# Patient Record
Sex: FEMALE | Race: BLACK OR AFRICAN AMERICAN | NOT HISPANIC OR LATINO | URBAN - METROPOLITAN AREA
[De-identification: names, ages, dates, MRNs, and addresses within clinical notes are randomized per-mention and may not be internally consistent; named-entity substitution may affect disease eponyms.]

---

## 2017-05-16 ENCOUNTER — OUTPATIENT (OUTPATIENT)
Dept: OUTPATIENT SERVICES | Facility: HOSPITAL | Age: 32
LOS: 1 days | End: 2017-05-16

## 2017-05-18 DIAGNOSIS — Z01.20 ENCOUNTER FOR DENTAL EXAMINATION AND CLEANING WITHOUT ABNORMAL FINDINGS: ICD-10-CM

## 2017-06-15 ENCOUNTER — APPOINTMENT (OUTPATIENT)
Dept: OBGYN | Facility: HOSPITAL | Age: 32
End: 2017-06-15

## 2017-06-15 ENCOUNTER — LABORATORY RESULT (OUTPATIENT)
Age: 32
End: 2017-06-15

## 2017-06-15 ENCOUNTER — OUTPATIENT (OUTPATIENT)
Dept: OUTPATIENT SERVICES | Facility: HOSPITAL | Age: 32
LOS: 1 days | End: 2017-06-15

## 2017-06-15 VITALS
WEIGHT: 105 LBS | HEART RATE: 70 BPM | SYSTOLIC BLOOD PRESSURE: 133 MMHG | DIASTOLIC BLOOD PRESSURE: 85 MMHG | BODY MASS INDEX: 22.72 KG/M2

## 2017-06-15 DIAGNOSIS — N92.6 IRREGULAR MENSTRUATION, UNSPECIFIED: ICD-10-CM

## 2017-06-15 DIAGNOSIS — Z30.09 ENCOUNTER FOR OTHER GENERAL COUNSELING AND ADVICE ON CONTRACEPTION: ICD-10-CM

## 2017-06-15 DIAGNOSIS — Z01.419 ENCOUNTER FOR GYNECOLOGICAL EXAMINATION (GENERAL) (ROUTINE) WITHOUT ABNORMAL FINDINGS: ICD-10-CM

## 2017-06-15 LAB
ALBUMIN SERPL ELPH-MCNC: 4.1 G/DL — SIGNIFICANT CHANGE UP (ref 3.3–5)
ALP SERPL-CCNC: 82 U/L — SIGNIFICANT CHANGE UP (ref 40–120)
ALT FLD-CCNC: 12 U/L — SIGNIFICANT CHANGE UP (ref 4–33)
AST SERPL-CCNC: 19 U/L — SIGNIFICANT CHANGE UP (ref 4–32)
BASOPHILS # BLD AUTO: 0.02 K/UL — SIGNIFICANT CHANGE UP (ref 0–0.2)
BASOPHILS NFR BLD AUTO: 0.6 % — SIGNIFICANT CHANGE UP (ref 0–2)
BILIRUB SERPL-MCNC: 0.5 MG/DL — SIGNIFICANT CHANGE UP (ref 0.2–1.2)
BUN SERPL-MCNC: 12 MG/DL — SIGNIFICANT CHANGE UP (ref 7–23)
CALCIUM SERPL-MCNC: 9.1 MG/DL — SIGNIFICANT CHANGE UP (ref 8.4–10.5)
CHLORIDE SERPL-SCNC: 103 MMOL/L — SIGNIFICANT CHANGE UP (ref 98–107)
CO2 SERPL-SCNC: 22 MMOL/L — SIGNIFICANT CHANGE UP (ref 22–31)
CREAT SERPL-MCNC: 0.77 MG/DL — SIGNIFICANT CHANGE UP (ref 0.5–1.3)
EOSINOPHIL # BLD AUTO: 0.11 K/UL — SIGNIFICANT CHANGE UP (ref 0–0.5)
EOSINOPHIL NFR BLD AUTO: 3.2 % — SIGNIFICANT CHANGE UP (ref 0–6)
GLUCOSE SERPL-MCNC: 83 MG/DL — SIGNIFICANT CHANGE UP (ref 70–99)
HBA1C BLD-MCNC: 5.5 % — SIGNIFICANT CHANGE UP (ref 4–5.6)
HBV SURFACE AG SER-ACNC: NEGATIVE — SIGNIFICANT CHANGE UP
HCT VFR BLD CALC: 39.7 % — SIGNIFICANT CHANGE UP (ref 34.5–45)
HGB BLD-MCNC: 12.5 G/DL — SIGNIFICANT CHANGE UP (ref 11.5–15.5)
HIV1 AG SER QL: SIGNIFICANT CHANGE UP
HIV1+2 AB SPEC QL: SIGNIFICANT CHANGE UP
IMM GRANULOCYTES NFR BLD AUTO: 0.3 % — SIGNIFICANT CHANGE UP (ref 0–1.5)
LYMPHOCYTES # BLD AUTO: 1.18 K/UL — SIGNIFICANT CHANGE UP (ref 1–3.3)
LYMPHOCYTES # BLD AUTO: 34.5 % — SIGNIFICANT CHANGE UP (ref 13–44)
MCHC RBC-ENTMCNC: 28 PG — SIGNIFICANT CHANGE UP (ref 27–34)
MCHC RBC-ENTMCNC: 31.5 % — LOW (ref 32–36)
MCV RBC AUTO: 89 FL — SIGNIFICANT CHANGE UP (ref 80–100)
MONOCYTES # BLD AUTO: 0.31 K/UL — SIGNIFICANT CHANGE UP (ref 0–0.9)
MONOCYTES NFR BLD AUTO: 9.1 % — SIGNIFICANT CHANGE UP (ref 2–14)
NEUTROPHILS # BLD AUTO: 1.79 K/UL — LOW (ref 1.8–7.4)
NEUTROPHILS NFR BLD AUTO: 52.3 % — SIGNIFICANT CHANGE UP (ref 43–77)
PLATELET # BLD AUTO: 265 K/UL — SIGNIFICANT CHANGE UP (ref 150–400)
PMV BLD: 11.2 FL — SIGNIFICANT CHANGE UP (ref 7–13)
POTASSIUM SERPL-MCNC: 3.8 MMOL/L — SIGNIFICANT CHANGE UP (ref 3.5–5.3)
POTASSIUM SERPL-SCNC: 3.8 MMOL/L — SIGNIFICANT CHANGE UP (ref 3.5–5.3)
PROT SERPL-MCNC: 7.2 G/DL — SIGNIFICANT CHANGE UP (ref 6–8.3)
RBC # BLD: 4.46 M/UL — SIGNIFICANT CHANGE UP (ref 3.8–5.2)
RBC # FLD: 13.3 % — SIGNIFICANT CHANGE UP (ref 10.3–14.5)
SODIUM SERPL-SCNC: 140 MMOL/L — SIGNIFICANT CHANGE UP (ref 135–145)
WBC # BLD: 3.42 K/UL — LOW (ref 3.8–10.5)
WBC # FLD AUTO: 3.42 K/UL — LOW (ref 3.8–10.5)

## 2017-06-16 LAB
C TRACH RRNA SPEC QL NAA+PROBE: SIGNIFICANT CHANGE UP
HCV AB S/CO SERPL IA: 0.1 S/CO — SIGNIFICANT CHANGE UP
HCV AB SERPL-IMP: SIGNIFICANT CHANGE UP
N GONORRHOEA RRNA SPEC QL NAA+PROBE: SIGNIFICANT CHANGE UP
SPECIMEN SOURCE: SIGNIFICANT CHANGE UP
T PALLIDUM AB TITR SER: NEGATIVE — SIGNIFICANT CHANGE UP

## 2017-06-20 LAB
HCG UR QL: NEGATIVE
QUALITY CONTROL: YES

## 2017-06-29 ENCOUNTER — APPOINTMENT (OUTPATIENT)
Dept: OBGYN | Facility: HOSPITAL | Age: 32
End: 2017-06-29

## 2017-07-27 ENCOUNTER — APPOINTMENT (OUTPATIENT)
Dept: OBGYN | Facility: HOSPITAL | Age: 32
End: 2017-07-27

## 2017-08-09 ENCOUNTER — APPOINTMENT (OUTPATIENT)
Dept: INTERNAL MEDICINE | Facility: HOSPITAL | Age: 32
End: 2017-08-09
Payer: MEDICAID

## 2017-08-09 ENCOUNTER — OUTPATIENT (OUTPATIENT)
Dept: OUTPATIENT SERVICES | Facility: HOSPITAL | Age: 32
LOS: 1 days | End: 2017-08-09

## 2017-08-09 VITALS — BODY MASS INDEX: 22.44 KG/M2 | HEIGHT: 57 IN | WEIGHT: 104 LBS

## 2017-08-09 VITALS — DIASTOLIC BLOOD PRESSURE: 80 MMHG | SYSTOLIC BLOOD PRESSURE: 132 MMHG

## 2017-08-09 PROCEDURE — 99213 OFFICE O/P EST LOW 20 MIN: CPT | Mod: GC

## 2017-08-14 DIAGNOSIS — G80.9 CEREBRAL PALSY, UNSPECIFIED: ICD-10-CM

## 2018-10-01 ENCOUNTER — OUTPATIENT (OUTPATIENT)
Dept: OUTPATIENT SERVICES | Facility: HOSPITAL | Age: 33
LOS: 1 days | End: 2018-10-01
Payer: MEDICAID

## 2018-10-01 PROCEDURE — G9001: CPT

## 2018-10-06 ENCOUNTER — EMERGENCY (EMERGENCY)
Facility: HOSPITAL | Age: 33
LOS: 0 days | Discharge: HOME | End: 2018-10-07
Attending: EMERGENCY MEDICINE
Payer: MEDICAID

## 2018-10-06 VITALS
WEIGHT: 123.46 LBS | HEIGHT: 59 IN | SYSTOLIC BLOOD PRESSURE: 136 MMHG | OXYGEN SATURATION: 97 % | HEART RATE: 93 BPM | DIASTOLIC BLOOD PRESSURE: 99 MMHG | RESPIRATION RATE: 17 BRPM | TEMPERATURE: 98 F

## 2018-10-06 DIAGNOSIS — R06.02 SHORTNESS OF BREATH: ICD-10-CM

## 2018-10-06 DIAGNOSIS — R07.9 CHEST PAIN, UNSPECIFIED: ICD-10-CM

## 2018-10-06 DIAGNOSIS — M79.604 PAIN IN RIGHT LEG: ICD-10-CM

## 2018-10-06 DIAGNOSIS — R06.00 DYSPNEA, UNSPECIFIED: ICD-10-CM

## 2018-10-06 DIAGNOSIS — M79.605 PAIN IN LEFT LEG: ICD-10-CM

## 2018-10-06 PROCEDURE — 99285 EMERGENCY DEPT VISIT HI MDM: CPT

## 2018-10-06 NOTE — ED ADULT TRIAGE NOTE - CHIEF COMPLAINT QUOTE
as per significant other "I was here twice for a lung infection and there was something in the house and we use a cover and the cover must had mold and she told me she could not breathe and she was having chest pain, she about 1 month pregnant"

## 2018-10-07 VITALS
DIASTOLIC BLOOD PRESSURE: 73 MMHG | RESPIRATION RATE: 15 BRPM | OXYGEN SATURATION: 98 % | TEMPERATURE: 98 F | HEART RATE: 89 BPM | SYSTOLIC BLOOD PRESSURE: 118 MMHG

## 2018-10-07 LAB
ALBUMIN SERPL ELPH-MCNC: 3.4 G/DL — SIGNIFICANT CHANGE UP (ref 3.3–5)
ALP SERPL-CCNC: 117 U/L — SIGNIFICANT CHANGE UP (ref 40–120)
ALT FLD-CCNC: 29 U/L — SIGNIFICANT CHANGE UP (ref 12–78)
ANION GAP SERPL CALC-SCNC: 9 MMOL/L — SIGNIFICANT CHANGE UP (ref 5–17)
APTT BLD: 27.7 SEC — SIGNIFICANT CHANGE UP (ref 27.5–37.4)
AST SERPL-CCNC: 20 U/L — SIGNIFICANT CHANGE UP (ref 15–37)
BASOPHILS # BLD AUTO: 0.05 K/UL — SIGNIFICANT CHANGE UP (ref 0–0.2)
BASOPHILS NFR BLD AUTO: 0.7 % — SIGNIFICANT CHANGE UP (ref 0–2)
BILIRUB SERPL-MCNC: 0.2 MG/DL — SIGNIFICANT CHANGE UP (ref 0.2–1.2)
BUN SERPL-MCNC: 8 MG/DL — SIGNIFICANT CHANGE UP (ref 7–23)
CALCIUM SERPL-MCNC: 9.1 MG/DL — SIGNIFICANT CHANGE UP (ref 8.5–10.1)
CHLORIDE SERPL-SCNC: 106 MMOL/L — SIGNIFICANT CHANGE UP (ref 96–108)
CK MB BLD-MCNC: <0.7 % — SIGNIFICANT CHANGE UP (ref 0–3.5)
CK MB CFR SERPL CALC: <1 NG/ML — SIGNIFICANT CHANGE UP (ref 0.5–3.6)
CK SERPL-CCNC: 136 U/L — SIGNIFICANT CHANGE UP (ref 26–192)
CO2 SERPL-SCNC: 22 MMOL/L — SIGNIFICANT CHANGE UP (ref 22–31)
CREAT SERPL-MCNC: 0.77 MG/DL — SIGNIFICANT CHANGE UP (ref 0.5–1.3)
D DIMER BLD IA.RAPID-MCNC: 280 NG/ML DDU — HIGH
EOSINOPHIL # BLD AUTO: 0.16 K/UL — SIGNIFICANT CHANGE UP (ref 0–0.5)
EOSINOPHIL NFR BLD AUTO: 2.2 % — SIGNIFICANT CHANGE UP (ref 0–6)
GLUCOSE SERPL-MCNC: 154 MG/DL — HIGH (ref 70–99)
HCG SERPL-ACNC: HIGH MIU/ML
HCT VFR BLD CALC: 40.6 % — SIGNIFICANT CHANGE UP (ref 34.5–45)
HGB BLD-MCNC: 13.3 G/DL — SIGNIFICANT CHANGE UP (ref 11.5–15.5)
IMM GRANULOCYTES NFR BLD AUTO: 0.1 % — SIGNIFICANT CHANGE UP (ref 0–1.5)
INR BLD: 1.01 RATIO — SIGNIFICANT CHANGE UP (ref 0.88–1.16)
LYMPHOCYTES # BLD AUTO: 1.4 K/UL — SIGNIFICANT CHANGE UP (ref 1–3.3)
LYMPHOCYTES # BLD AUTO: 18.8 % — SIGNIFICANT CHANGE UP (ref 13–44)
MCHC RBC-ENTMCNC: 29.2 PG — SIGNIFICANT CHANGE UP (ref 27–34)
MCHC RBC-ENTMCNC: 32.8 GM/DL — SIGNIFICANT CHANGE UP (ref 32–36)
MCV RBC AUTO: 89.2 FL — SIGNIFICANT CHANGE UP (ref 80–100)
MONOCYTES # BLD AUTO: 0.79 K/UL — SIGNIFICANT CHANGE UP (ref 0–0.9)
MONOCYTES NFR BLD AUTO: 10.6 % — SIGNIFICANT CHANGE UP (ref 2–14)
NEUTROPHILS # BLD AUTO: 5.02 K/UL — SIGNIFICANT CHANGE UP (ref 1.8–7.4)
NEUTROPHILS NFR BLD AUTO: 67.6 % — SIGNIFICANT CHANGE UP (ref 43–77)
NRBC # BLD: 0 /100 WBCS — SIGNIFICANT CHANGE UP (ref 0–0)
NT-PROBNP SERPL-SCNC: 41 PG/ML — SIGNIFICANT CHANGE UP (ref 0–125)
PLATELET # BLD AUTO: 259 K/UL — SIGNIFICANT CHANGE UP (ref 150–400)
POTASSIUM SERPL-MCNC: 4.1 MMOL/L — SIGNIFICANT CHANGE UP (ref 3.5–5.3)
POTASSIUM SERPL-SCNC: 4.1 MMOL/L — SIGNIFICANT CHANGE UP (ref 3.5–5.3)
PROT SERPL-MCNC: 7.4 GM/DL — SIGNIFICANT CHANGE UP (ref 6–8.3)
PROTHROM AB SERPL-ACNC: 11 SEC — SIGNIFICANT CHANGE UP (ref 9.8–12.7)
RBC # BLD: 4.55 M/UL — SIGNIFICANT CHANGE UP (ref 3.8–5.2)
RBC # FLD: 12.9 % — SIGNIFICANT CHANGE UP (ref 10.3–14.5)
SODIUM SERPL-SCNC: 137 MMOL/L — SIGNIFICANT CHANGE UP (ref 135–145)
TROPONIN I SERPL-MCNC: <.015 NG/ML — SIGNIFICANT CHANGE UP (ref 0.01–0.04)
WBC # BLD: 7.43 K/UL — SIGNIFICANT CHANGE UP (ref 3.8–10.5)
WBC # FLD AUTO: 7.43 K/UL — SIGNIFICANT CHANGE UP (ref 3.8–10.5)

## 2018-10-07 PROCEDURE — 93970 EXTREMITY STUDY: CPT | Mod: 26

## 2018-10-07 RX ORDER — ALBUTEROL 90 UG/1
2 AEROSOL, METERED ORAL
Qty: 1 | Refills: 0 | OUTPATIENT
Start: 2018-10-07 | End: 2018-11-05

## 2018-10-07 RX ORDER — IPRATROPIUM/ALBUTEROL SULFATE 18-103MCG
3 AEROSOL WITH ADAPTER (GRAM) INHALATION ONCE
Qty: 0 | Refills: 0 | Status: COMPLETED | OUTPATIENT
Start: 2018-10-07 | End: 2018-10-07

## 2018-10-07 RX ADMIN — Medication 3 MILLILITER(S): at 01:14

## 2018-10-07 RX ADMIN — Medication 3 MILLILITER(S): at 00:39

## 2018-10-07 NOTE — ED PROVIDER NOTE - PROGRESS NOTE DETAILS
Patient received albuterol with complete resolution of chest pain or SOB.  Patient's Dimer is elevated, discussed possibility of PE, although Dimer may be elevated as result of pregnancy.  Patient would prefer Patient received albuterol with complete resolution of chest pain or SOB.  Patient's Dimer is elevated, discussed possibility of PE, although Dimer may be elevated as result of pregnancy.  Patient would prefer to have DVT US study instead.  Despite her mild CP/MR, she displays very good comprehension.

## 2018-10-07 NOTE — ED PROVIDER NOTE - MEDICAL DECISION MAKING DETAILS
Patient presents with shortness of breath, occasional chest pain.  VSS, looks comfortable.  Patient is pregnant, denies any abdominal pain or bleeding.  Feels symptoms are due to mold in house, her spouse is more symptomatic than she is.  Patient's Dimer is elevated which can be seen in setting of pregnancy, DVT negative, patient does not want CT imaging, denies any SOB or chest pain after she received neb.  Denies any exertional shortness of breath.  Feels good, prefers to see if her symptoms return, will respect patient's wishes as suspicion for PE is low (suspect that she is more likely here because of her partner).  No signs of infection.  Patient to follow up with OB/GYN. Signs and symptoms of PE which should prompt immediate return were discussed with patient. Discussed results and outcome of today's visit with the patient.  Patient advised to please follow up with another healthcare provider within the next 24 hours and return to the Emergency Department for worsening symptoms or any other concerns.  Patient advised that their doctor may call  to follow up on the specific results of the tests performed today in the emergency department.   Patient appears well on discharge.

## 2018-10-07 NOTE — ED PROVIDER NOTE - OBJECTIVE STATEMENT
Pertinent PMH/PSH/FHx/SHx and Review of Systems contained within:  Patient presents to the ED for chest pain.  Patient is well appearing, laughing, smiling, says that she has very mild chest pain with difficulty breathing which started about 2-3 days ago.  She is here with her spouse who is also having difficulty breathing.  Both look comfortable and say that they have been exposed to mold in the home which is likely causing their symptoms.  Patient also believes that she is pregnant since she skipped her period, LMP was Aug 21.  Did not take any pregnancy tests at home.  Denies any abdominal pain or vaginal bleeding, denies any leg swelling.  No fevers or cough.     Relevant PMHx/SHx/SOCHx/FAMH:  Mild CP,   Patient denies EtOH/tobacco/illicit substance use.    ROS: No fever/chills, No headache/photophobia/eye pain/changes in vision, No ear pain/sore throat/dysphagia, No palpitations, no cough/wheeze/stridor, No abdominal pain, No N/V/D/melena, no dysuria/frequency/discharge, No neck/back pain, no rash, no changes in neurological status/function.

## 2018-10-19 DIAGNOSIS — Z71.89 OTHER SPECIFIED COUNSELING: ICD-10-CM

## 2018-11-04 ENCOUNTER — EMERGENCY (EMERGENCY)
Facility: HOSPITAL | Age: 33
LOS: 0 days | Discharge: HOME | End: 2018-11-04
Attending: EMERGENCY MEDICINE
Payer: MEDICAID

## 2018-11-04 VITALS
DIASTOLIC BLOOD PRESSURE: 92 MMHG | RESPIRATION RATE: 18 BRPM | WEIGHT: 126.55 LBS | HEART RATE: 95 BPM | SYSTOLIC BLOOD PRESSURE: 142 MMHG | OXYGEN SATURATION: 100 % | TEMPERATURE: 98 F

## 2018-11-04 VITALS
RESPIRATION RATE: 18 BRPM | OXYGEN SATURATION: 98 % | SYSTOLIC BLOOD PRESSURE: 129 MMHG | DIASTOLIC BLOOD PRESSURE: 83 MMHG | TEMPERATURE: 97 F | HEART RATE: 91 BPM

## 2018-11-04 DIAGNOSIS — R06.02 SHORTNESS OF BREATH: ICD-10-CM

## 2018-11-04 DIAGNOSIS — Z98.890 OTHER SPECIFIED POSTPROCEDURAL STATES: Chronic | ICD-10-CM

## 2018-11-04 DIAGNOSIS — R82.71 BACTERIURIA: ICD-10-CM

## 2018-11-04 DIAGNOSIS — R10.13 EPIGASTRIC PAIN: ICD-10-CM

## 2018-11-04 LAB
ALBUMIN SERPL ELPH-MCNC: 2.8 G/DL — LOW (ref 3.3–5)
ALP SERPL-CCNC: 106 U/L — SIGNIFICANT CHANGE UP (ref 40–120)
ALT FLD-CCNC: 26 U/L — SIGNIFICANT CHANGE UP (ref 12–78)
ANION GAP SERPL CALC-SCNC: 10 MMOL/L — SIGNIFICANT CHANGE UP (ref 5–17)
APPEARANCE UR: CLEAR — SIGNIFICANT CHANGE UP
APTT BLD: 28.9 SEC — SIGNIFICANT CHANGE UP (ref 28.5–37)
AST SERPL-CCNC: 9 U/L — LOW (ref 15–37)
BACTERIA # UR AUTO: ABNORMAL
BASOPHILS # BLD AUTO: 0.03 K/UL — SIGNIFICANT CHANGE UP (ref 0–0.2)
BASOPHILS NFR BLD AUTO: 0.4 % — SIGNIFICANT CHANGE UP (ref 0–2)
BILIRUB SERPL-MCNC: 0.2 MG/DL — SIGNIFICANT CHANGE UP (ref 0.2–1.2)
BILIRUB UR-MCNC: NEGATIVE — SIGNIFICANT CHANGE UP
BLD GP AB SCN SERPL QL: SIGNIFICANT CHANGE UP
BUN SERPL-MCNC: 9 MG/DL — SIGNIFICANT CHANGE UP (ref 7–23)
CALCIUM SERPL-MCNC: 8.2 MG/DL — LOW (ref 8.5–10.1)
CHLORIDE SERPL-SCNC: 110 MMOL/L — HIGH (ref 96–108)
CO2 SERPL-SCNC: 20 MMOL/L — LOW (ref 22–31)
COLOR SPEC: YELLOW — SIGNIFICANT CHANGE UP
CREAT SERPL-MCNC: 0.59 MG/DL — SIGNIFICANT CHANGE UP (ref 0.5–1.3)
DIFF PNL FLD: NEGATIVE — SIGNIFICANT CHANGE UP
EOSINOPHIL # BLD AUTO: 0.21 K/UL — SIGNIFICANT CHANGE UP (ref 0–0.5)
EOSINOPHIL NFR BLD AUTO: 2.6 % — SIGNIFICANT CHANGE UP (ref 0–6)
EPI CELLS # UR: ABNORMAL
GLUCOSE SERPL-MCNC: 108 MG/DL — HIGH (ref 70–99)
GLUCOSE UR QL: 1000 MG/DL
HCG SERPL-ACNC: HIGH MIU/ML
HCT VFR BLD CALC: 38.5 % — SIGNIFICANT CHANGE UP (ref 34.5–45)
HGB BLD-MCNC: 12.9 G/DL — SIGNIFICANT CHANGE UP (ref 11.5–15.5)
IMM GRANULOCYTES NFR BLD AUTO: 0.4 % — SIGNIFICANT CHANGE UP (ref 0–1.5)
INR BLD: 1.03 RATIO — SIGNIFICANT CHANGE UP (ref 0.88–1.16)
KETONES UR-MCNC: NEGATIVE — SIGNIFICANT CHANGE UP
LEUKOCYTE ESTERASE UR-ACNC: NEGATIVE — SIGNIFICANT CHANGE UP
LIDOCAIN IGE QN: 248 U/L — SIGNIFICANT CHANGE UP (ref 73–393)
LYMPHOCYTES # BLD AUTO: 1.77 K/UL — SIGNIFICANT CHANGE UP (ref 1–3.3)
LYMPHOCYTES # BLD AUTO: 21.9 % — SIGNIFICANT CHANGE UP (ref 13–44)
MCHC RBC-ENTMCNC: 29.7 PG — SIGNIFICANT CHANGE UP (ref 27–34)
MCHC RBC-ENTMCNC: 33.5 GM/DL — SIGNIFICANT CHANGE UP (ref 32–36)
MCV RBC AUTO: 88.7 FL — SIGNIFICANT CHANGE UP (ref 80–100)
MONOCYTES # BLD AUTO: 0.96 K/UL — HIGH (ref 0–0.9)
MONOCYTES NFR BLD AUTO: 11.9 % — SIGNIFICANT CHANGE UP (ref 2–14)
NEUTROPHILS # BLD AUTO: 5.1 K/UL — SIGNIFICANT CHANGE UP (ref 1.8–7.4)
NEUTROPHILS NFR BLD AUTO: 62.8 % — SIGNIFICANT CHANGE UP (ref 43–77)
NITRITE UR-MCNC: NEGATIVE — SIGNIFICANT CHANGE UP
NRBC # BLD: 0 /100 WBCS — SIGNIFICANT CHANGE UP (ref 0–0)
PH UR: 6 — SIGNIFICANT CHANGE UP (ref 5–8)
PLATELET # BLD AUTO: 234 K/UL — SIGNIFICANT CHANGE UP (ref 150–400)
POTASSIUM SERPL-MCNC: 4.1 MMOL/L — SIGNIFICANT CHANGE UP (ref 3.5–5.3)
POTASSIUM SERPL-SCNC: 4.1 MMOL/L — SIGNIFICANT CHANGE UP (ref 3.5–5.3)
PROT SERPL-MCNC: 6.9 GM/DL — SIGNIFICANT CHANGE UP (ref 6–8.3)
PROT UR-MCNC: 15 MG/DL
PROTHROM AB SERPL-ACNC: 11.5 SEC — SIGNIFICANT CHANGE UP (ref 10–12.9)
RBC # BLD: 4.34 M/UL — SIGNIFICANT CHANGE UP (ref 3.8–5.2)
RBC # FLD: 12.6 % — SIGNIFICANT CHANGE UP (ref 10.3–14.5)
RBC CASTS # UR COMP ASSIST: SIGNIFICANT CHANGE UP /HPF (ref 0–4)
SODIUM SERPL-SCNC: 140 MMOL/L — SIGNIFICANT CHANGE UP (ref 135–145)
SP GR SPEC: 1.02 — SIGNIFICANT CHANGE UP (ref 1.01–1.02)
TROPONIN I SERPL-MCNC: <.015 NG/ML — SIGNIFICANT CHANGE UP (ref 0.01–0.04)
UROBILINOGEN FLD QL: NEGATIVE MG/DL — SIGNIFICANT CHANGE UP
WBC # BLD: 8.1 K/UL — SIGNIFICANT CHANGE UP (ref 3.8–10.5)
WBC # FLD AUTO: 8.1 K/UL — SIGNIFICANT CHANGE UP (ref 3.8–10.5)
WBC UR QL: SIGNIFICANT CHANGE UP

## 2018-11-04 PROCEDURE — 76700 US EXAM ABDOM COMPLETE: CPT | Mod: 26

## 2018-11-04 PROCEDURE — 99284 EMERGENCY DEPT VISIT MOD MDM: CPT | Mod: 25

## 2018-11-04 PROCEDURE — 76805 OB US >/= 14 WKS SNGL FETUS: CPT | Mod: 26

## 2018-11-04 PROCEDURE — 93010 ELECTROCARDIOGRAM REPORT: CPT

## 2018-11-04 RX ORDER — NITROFURANTOIN MACROCRYSTAL 50 MG
100 CAPSULE ORAL ONCE
Qty: 0 | Refills: 0 | Status: COMPLETED | OUTPATIENT
Start: 2018-11-04 | End: 2018-11-04

## 2018-11-04 RX ORDER — FAMOTIDINE 10 MG/ML
20 INJECTION INTRAVENOUS ONCE
Qty: 0 | Refills: 0 | Status: COMPLETED | OUTPATIENT
Start: 2018-11-04 | End: 2018-11-04

## 2018-11-04 RX ORDER — NITROFURANTOIN MACROCRYSTAL 50 MG
1 CAPSULE ORAL
Qty: 14 | Refills: 0 | OUTPATIENT
Start: 2018-11-04 | End: 2018-11-10

## 2018-11-04 RX ORDER — SODIUM CHLORIDE 9 MG/ML
1000 INJECTION INTRAMUSCULAR; INTRAVENOUS; SUBCUTANEOUS ONCE
Qty: 0 | Refills: 0 | Status: COMPLETED | OUTPATIENT
Start: 2018-11-04 | End: 2018-11-04

## 2018-11-04 RX ADMIN — FAMOTIDINE 20 MILLIGRAM(S): 10 INJECTION INTRAVENOUS at 08:44

## 2018-11-04 RX ADMIN — Medication 100 MILLIGRAM(S): at 13:22

## 2018-11-04 RX ADMIN — SODIUM CHLORIDE 1000 MILLILITER(S): 9 INJECTION INTRAMUSCULAR; INTRAVENOUS; SUBCUTANEOUS at 13:02

## 2018-11-04 RX ADMIN — SODIUM CHLORIDE 1000 MILLILITER(S): 9 INJECTION INTRAMUSCULAR; INTRAVENOUS; SUBCUTANEOUS at 08:43

## 2018-11-04 NOTE — ED ADULT TRIAGE NOTE - CHIEF COMPLAINT QUOTE
pr presents to the hospital with complaints of shortness of breath, LMP 2018 states she was told she was pregnant didn't have any  care after previous ER visit, BL CTA, states ROCK + palpitations, denies PE hx pt presents to the hospital with complaints of shortness of breath, LMP 8/29/2018 states she was told she was pregnant didn't have any prenatal care after previous ER visit, BL CTA, states ROCK + palpitations, denies PE hx

## 2018-11-04 NOTE — ED PROVIDER NOTE - OBJECTIVE STATEMENT
Pertinent PMH/PSH/FHx/SHx and Review of Systems contained within:  Patient presents to the ED for epigastric pain when bending and after eating pizza and drinking orange juice for the past day.  Patient states LMP 7/2018 but appears >22 weeks pregnant.  no prenatal care.  fiance bedside.  PMH  of CP.  no other concers.  no other complaints.  no Sx with inspiration or at rest.  no Sx when fully standing, only with flexion and pressue on the stomahc.  VSS. Non toxic.  Well appearing. No aggravating or relieving factors. No other pertinent PMH.  No other pertinent PSH.  No other pertinent FHx.  Patient denies EtOH/tobacco/illicit substance use. No fever/chills, No photophobia/eye pain/changes in vision, No ear pain/sore throat/dysphagia, No chest pain/palpitations, no SOB/cough/wheeze/stridor, No other abdominal pain, No N/V/D, no dysuria/frequency/discharge, No neck/back pain, no rash, no changes in neurological status/function.

## 2018-11-04 NOTE — ED ADULT NURSE NOTE - CHIEF COMPLAINT QUOTE
pr presents to the hospital with complaints of shortness of breath, LMP 2018 states she was told she was pregnant didn't have any  care after previous ER visit, BL CTA, states ROCK + palpitations, denies PE hx

## 2018-11-04 NOTE — ED ADULT NURSE NOTE - OBJECTIVE STATEMENT
patient received, came here for sob stated "im pregnant as well" patient does not know how many weeks she is pregnant but LMP july 21. denies chest pain, no resp distress noted

## 2018-11-04 NOTE — ED PROVIDER NOTE - MEDICAL DECISION MAKING DETAILS
Patient prenst with epgiastric pain related to eating and posiition.  VSS.  Lab values do not require emergent intervention. Ultrasound demonstrates no acute pathology. pain free in ED.  tolerating PO.  bacteria in UA and will treat.  has prenatal vitamins.  has f/u appt already swcheduled.  VSS.  Uneventful ED observation period. Non toxic.  Well appearing. smiling.  Patient given prescription medications for their condition and advised to take them as prescribed and check with their Primary Care Provider if any questions arise. Discussed results and outcome of testing with the patient.  Patient advised to please follow up with their primary care doctor within the next 24 hours and return to the Emergency Department for worsening symptoms or any other concerns.  Patient advised that their doctor may call  to follow up on the specific results of the tests performed today in the emergency department.

## 2018-11-05 LAB
CULTURE RESULTS: SIGNIFICANT CHANGE UP
SPECIMEN SOURCE: SIGNIFICANT CHANGE UP

## 2018-11-21 ENCOUNTER — LABORATORY RESULT (OUTPATIENT)
Age: 33
End: 2018-11-21

## 2018-11-21 ENCOUNTER — APPOINTMENT (OUTPATIENT)
Dept: ANTEPARTUM | Facility: CLINIC | Age: 33
End: 2018-11-21
Payer: MEDICAID

## 2018-11-21 ENCOUNTER — APPOINTMENT (OUTPATIENT)
Dept: OBGYN | Facility: HOSPITAL | Age: 33
End: 2018-11-21

## 2018-11-21 ENCOUNTER — RESULT REVIEW (OUTPATIENT)
Age: 33
End: 2018-11-21

## 2018-11-21 ENCOUNTER — NON-APPOINTMENT (OUTPATIENT)
Age: 33
End: 2018-11-21

## 2018-11-21 ENCOUNTER — ASOB RESULT (OUTPATIENT)
Age: 33
End: 2018-11-21

## 2018-11-21 ENCOUNTER — OUTPATIENT (OUTPATIENT)
Dept: OUTPATIENT SERVICES | Facility: HOSPITAL | Age: 33
LOS: 1 days | End: 2018-11-21
Payer: MEDICAID

## 2018-11-21 VITALS
RESPIRATION RATE: 24 BRPM | HEIGHT: 59 IN | HEART RATE: 112 BPM | DIASTOLIC BLOOD PRESSURE: 82 MMHG | WEIGHT: 126 LBS | BODY MASS INDEX: 25.4 KG/M2 | SYSTOLIC BLOOD PRESSURE: 130 MMHG

## 2018-11-21 DIAGNOSIS — Z98.890 OTHER SPECIFIED POSTPROCEDURAL STATES: Chronic | ICD-10-CM

## 2018-11-21 DIAGNOSIS — R03.0 ELEVATED BLOOD-PRESSURE READING, W/OUT DIAGNOSIS OF HYPERTENSION: ICD-10-CM

## 2018-11-21 LAB
APPEARANCE UR: CLEAR — SIGNIFICANT CHANGE UP
BILIRUB UR-MCNC: NEGATIVE — SIGNIFICANT CHANGE UP
BLOOD UR QL VISUAL: NEGATIVE — SIGNIFICANT CHANGE UP
COLOR SPEC: SIGNIFICANT CHANGE UP
GLUCOSE UR-MCNC: >1000 — HIGH
HBV SURFACE AG SER-ACNC: NONREACTIVE — SIGNIFICANT CHANGE UP
HCV AB S/CO SERPL IA: 0.07 S/CO — SIGNIFICANT CHANGE UP
HCV AB SERPL-IMP: SIGNIFICANT CHANGE UP
HIV 1+2 AB+HIV1 P24 AG SERPL QL IA: SIGNIFICANT CHANGE UP
KETONES UR-MCNC: SIGNIFICANT CHANGE UP
LEUKOCYTE ESTERASE UR-ACNC: NEGATIVE — SIGNIFICANT CHANGE UP
NITRITE UR-MCNC: NEGATIVE — SIGNIFICANT CHANGE UP
PH UR: 6.5 — SIGNIFICANT CHANGE UP (ref 5–8)
PROT UR-MCNC: NEGATIVE — SIGNIFICANT CHANGE UP
SP GR SPEC: 1.02 — SIGNIFICANT CHANGE UP (ref 1–1.04)
URATE SERPL-MCNC: 3.3 MG/DL — SIGNIFICANT CHANGE UP (ref 2.5–7)
UROBILINOGEN FLD QL: NORMAL — SIGNIFICANT CHANGE UP

## 2018-11-21 PROCEDURE — 76813 OB US NUCHAL MEAS 1 GEST: CPT | Mod: 26

## 2018-11-21 PROCEDURE — 36416 COLLJ CAPILLARY BLOOD SPEC: CPT

## 2018-11-21 PROCEDURE — G0452: CPT | Mod: 26

## 2018-11-21 PROCEDURE — 76801 OB US < 14 WKS SINGLE FETUS: CPT | Mod: 26

## 2018-11-22 LAB
HPV HIGH+LOW RISK DNA PNL CVX: SIGNIFICANT CHANGE UP
RUBV IGG SER-ACNC: 6.8 INDEX — SIGNIFICANT CHANGE UP
RUBV IGG SER-IMP: POSITIVE — SIGNIFICANT CHANGE UP
T PALLIDUM AB TITR SER: NEGATIVE — SIGNIFICANT CHANGE UP
VZV IGG FLD QL IA: >4000 INDEX — SIGNIFICANT CHANGE UP
VZV IGG FLD QL IA: POSITIVE — SIGNIFICANT CHANGE UP

## 2018-11-23 LAB
BACTERIA UR CULT: SIGNIFICANT CHANGE UP
C TRACH RRNA SPEC QL NAA+PROBE: SIGNIFICANT CHANGE UP
HGB A MFR BLD: 96.9 % — SIGNIFICANT CHANGE UP
HGB A2 MFR BLD: 2.8 % — SIGNIFICANT CHANGE UP (ref 2.4–3.5)
HGB ELECT COMMENT: SIGNIFICANT CHANGE UP
HGB F MFR BLD: < 1 % — SIGNIFICANT CHANGE UP (ref 0–1.5)
LEAD SERPL-MCNC: < 1 UG/DL — SIGNIFICANT CHANGE UP (ref 0–4)
N GONORRHOEA RRNA SPEC QL NAA+PROBE: SIGNIFICANT CHANGE UP
SPECIMEN SOURCE: SIGNIFICANT CHANGE UP
SPECIMEN SOURCE: SIGNIFICANT CHANGE UP

## 2018-11-24 LAB
GAMMA INTERFERON BACKGROUND BLD IA-ACNC: 0.03 IU/ML — SIGNIFICANT CHANGE UP
M TB IFN-G BLD-IMP: NEGATIVE — SIGNIFICANT CHANGE UP
M TB IFN-G CD4+ BCKGRND COR BLD-ACNC: -0.01 IU/ML — SIGNIFICANT CHANGE UP
M TB IFN-G CD4+CD8+ BCKGRND COR BLD-ACNC: 0 IU/ML — SIGNIFICANT CHANGE UP
QUANT TB PLUS MITOGEN MINUS NIL: 7.17 IU/ML — SIGNIFICANT CHANGE UP

## 2018-11-26 DIAGNOSIS — Z34.90 ENCOUNTER FOR SUPERVISION OF NORMAL PREGNANCY, UNSPECIFIED, UNSPECIFIED TRIMESTER: ICD-10-CM

## 2018-11-26 DIAGNOSIS — G80.9 CEREBRAL PALSY, UNSPECIFIED: ICD-10-CM

## 2018-11-26 DIAGNOSIS — O09.91 SUPERVISION OF HIGH RISK PREGNANCY, UNSPECIFIED, FIRST TRIMESTER: ICD-10-CM

## 2018-11-26 DIAGNOSIS — R03.0 ELEVATED BLOOD-PRESSURE READING, WITHOUT DIAGNOSIS OF HYPERTENSION: ICD-10-CM

## 2018-11-26 LAB — CYTOLOGY SPEC DOC CYTO: SIGNIFICANT CHANGE UP

## 2018-11-28 LAB
1ST TRIMESTER DATA: SIGNIFICANT CHANGE UP
ADDENDUM DOC: SIGNIFICANT CHANGE UP
AFP SERPL-ACNC: SIGNIFICANT CHANGE UP
B-HCG FREE SERPL-MCNC: SIGNIFICANT CHANGE UP
CFTR MUT ANL BLD/T: NEGATIVE — SIGNIFICANT CHANGE UP
CLINICAL BIOCHEMIST REVIEW: SIGNIFICANT CHANGE UP
CLINICAL BIOCHEMIST REVIEW: SIGNIFICANT CHANGE UP
DEMOGRAPHIC DATA: SIGNIFICANT CHANGE UP
NT: SIGNIFICANT CHANGE UP
PAPP-A SERPL-ACNC: SIGNIFICANT CHANGE UP
SCREEN-FOOTER: SIGNIFICANT CHANGE UP
SCREEN-RECOMMENDATIONS: SIGNIFICANT CHANGE UP

## 2018-11-29 ENCOUNTER — NON-APPOINTMENT (OUTPATIENT)
Age: 33
End: 2018-11-29

## 2018-12-03 ENCOUNTER — OUTPATIENT (OUTPATIENT)
Dept: OUTPATIENT SERVICES | Facility: HOSPITAL | Age: 33
LOS: 1 days | End: 2018-12-03

## 2018-12-03 ENCOUNTER — APPOINTMENT (OUTPATIENT)
Dept: OBGYN | Facility: HOSPITAL | Age: 33
End: 2018-12-03
Payer: MEDICAID

## 2018-12-03 ENCOUNTER — NON-APPOINTMENT (OUTPATIENT)
Age: 33
End: 2018-12-03

## 2018-12-03 ENCOUNTER — MED ADMIN CHARGE (OUTPATIENT)
Age: 33
End: 2018-12-03

## 2018-12-03 VITALS
HEART RATE: 100 BPM | SYSTOLIC BLOOD PRESSURE: 126 MMHG | BODY MASS INDEX: 27.61 KG/M2 | HEIGHT: 57 IN | WEIGHT: 128 LBS | DIASTOLIC BLOOD PRESSURE: 67 MMHG

## 2018-12-03 DIAGNOSIS — Z98.890 OTHER SPECIFIED POSTPROCEDURAL STATES: Chronic | ICD-10-CM

## 2018-12-03 LAB — GLUCOSE BLDC GLUCOMTR-MCNC: 147

## 2018-12-03 PROCEDURE — 99214 OFFICE O/P EST MOD 30 MIN: CPT | Mod: 25,GC

## 2018-12-03 PROCEDURE — 81003 URINALYSIS AUTO W/O SCOPE: CPT | Mod: NC,GC,QW

## 2018-12-03 PROCEDURE — 00016S: CUSTOM | Mod: NC,GC

## 2018-12-03 PROCEDURE — 90658 IIV3 VACCINE SPLT 0.5 ML IM: CPT | Mod: NC,GC

## 2018-12-04 DIAGNOSIS — O28.9 UNSPECIFIED ABNORMAL FINDINGS ON ANTENATAL SCREENING OF MOTHER: ICD-10-CM

## 2018-12-04 DIAGNOSIS — Z34.90 ENCOUNTER FOR SUPERVISION OF NORMAL PREGNANCY, UNSPECIFIED, UNSPECIFIED TRIMESTER: ICD-10-CM

## 2018-12-04 DIAGNOSIS — Z23 ENCOUNTER FOR IMMUNIZATION: ICD-10-CM

## 2018-12-04 DIAGNOSIS — G80.9 CEREBRAL PALSY, UNSPECIFIED: ICD-10-CM

## 2018-12-31 ENCOUNTER — LABORATORY RESULT (OUTPATIENT)
Age: 33
End: 2018-12-31

## 2018-12-31 ENCOUNTER — OUTPATIENT (OUTPATIENT)
Dept: OUTPATIENT SERVICES | Facility: HOSPITAL | Age: 33
LOS: 1 days | End: 2018-12-31

## 2018-12-31 ENCOUNTER — NON-APPOINTMENT (OUTPATIENT)
Age: 33
End: 2018-12-31

## 2018-12-31 ENCOUNTER — APPOINTMENT (OUTPATIENT)
Dept: OBGYN | Facility: HOSPITAL | Age: 33
End: 2018-12-31
Payer: MEDICAID

## 2018-12-31 VITALS
WEIGHT: 129 LBS | HEIGHT: 57 IN | HEART RATE: 97 BPM | DIASTOLIC BLOOD PRESSURE: 80 MMHG | BODY MASS INDEX: 27.83 KG/M2 | SYSTOLIC BLOOD PRESSURE: 124 MMHG

## 2018-12-31 DIAGNOSIS — Z98.890 OTHER SPECIFIED POSTPROCEDURAL STATES: Chronic | ICD-10-CM

## 2018-12-31 LAB
GLUCOSE BLDC GLUCOMTR-MCNC: 177
GLUCOSE PRE/P GLC SERPL-SCNC: 260 — HIGH (ref 65–115)
HBA1C BLD-MCNC: 6.9 % — HIGH (ref 4–5.6)

## 2018-12-31 PROCEDURE — 81003 URINALYSIS AUTO W/O SCOPE: CPT | Mod: NC,GC,QW

## 2018-12-31 PROCEDURE — 99214 OFFICE O/P EST MOD 30 MIN: CPT | Mod: 25,GC

## 2018-12-31 PROCEDURE — 82948 REAGENT STRIP/BLOOD GLUCOSE: CPT | Mod: NC,GC

## 2018-12-31 PROCEDURE — 36415 COLL VENOUS BLD VENIPUNCTURE: CPT | Mod: NC,GC

## 2019-01-02 ENCOUNTER — NON-APPOINTMENT (OUTPATIENT)
Age: 34
End: 2019-01-02

## 2019-01-02 DIAGNOSIS — O09.91 SUPERVISION OF HIGH RISK PREGNANCY, UNSPECIFIED, FIRST TRIMESTER: ICD-10-CM

## 2019-01-02 DIAGNOSIS — G80.9 CEREBRAL PALSY, UNSPECIFIED: ICD-10-CM

## 2019-01-02 DIAGNOSIS — R81 GLYCOSURIA: ICD-10-CM

## 2019-01-04 LAB
1ST TRIMESTER DATA: SIGNIFICANT CHANGE UP
2ND TRIMESTER DATA: SIGNIFICANT CHANGE UP
ADDENDUM DOC: SIGNIFICANT CHANGE UP
AFP SERPL-ACNC: SIGNIFICANT CHANGE UP
AFP SERPL-ACNC: SIGNIFICANT CHANGE UP
B-HCG FREE SERPL-MCNC: SIGNIFICANT CHANGE UP
B-HCG FREE SERPL-MCNC: SIGNIFICANT CHANGE UP
CLINICAL BIOCHEMIST REVIEW: SIGNIFICANT CHANGE UP
DEMOGRAPHIC DATA: SIGNIFICANT CHANGE UP
INHIBIN A SERPL-MCNC: SIGNIFICANT CHANGE UP
NT: SIGNIFICANT CHANGE UP
PAPP-A SERPL-ACNC: SIGNIFICANT CHANGE UP
SCREEN-FOOTER: SIGNIFICANT CHANGE UP
SCREEN-RECOMMENDATIONS: SIGNIFICANT CHANGE UP
U ESTRIOL SERPL-SCNC: SIGNIFICANT CHANGE UP

## 2019-01-09 ENCOUNTER — ASOB RESULT (OUTPATIENT)
Age: 34
End: 2019-01-09

## 2019-01-09 ENCOUNTER — OUTPATIENT (OUTPATIENT)
Dept: OUTPATIENT SERVICES | Facility: HOSPITAL | Age: 34
LOS: 1 days | End: 2019-01-09

## 2019-01-09 ENCOUNTER — APPOINTMENT (OUTPATIENT)
Dept: OBGYN | Facility: HOSPITAL | Age: 34
End: 2019-01-09

## 2019-01-09 ENCOUNTER — APPOINTMENT (OUTPATIENT)
Dept: OBGYN | Facility: HOSPITAL | Age: 34
End: 2019-01-09
Payer: MEDICAID

## 2019-01-09 ENCOUNTER — APPOINTMENT (OUTPATIENT)
Dept: ANTEPARTUM | Facility: CLINIC | Age: 34
End: 2019-01-09
Payer: MEDICAID

## 2019-01-09 DIAGNOSIS — Z98.890 OTHER SPECIFIED POSTPROCEDURAL STATES: Chronic | ICD-10-CM

## 2019-01-09 PROCEDURE — 76811 OB US DETAILED SNGL FETUS: CPT | Mod: 26

## 2019-01-09 PROCEDURE — G0108 DIAB MANAGE TRN  PER INDIV: CPT | Mod: NC

## 2019-01-09 PROCEDURE — 99241 OFFICE CONSULTATION NEW/ESTAB PATIENT 15 MIN: CPT | Mod: 25

## 2019-01-10 DIAGNOSIS — O24.419 GESTATIONAL DIABETES MELLITUS IN PREGNANCY, UNSPECIFIED CONTROL: ICD-10-CM

## 2019-01-14 ENCOUNTER — NON-APPOINTMENT (OUTPATIENT)
Age: 34
End: 2019-01-14

## 2019-01-14 ENCOUNTER — OTHER (OUTPATIENT)
Age: 34
End: 2019-01-14

## 2019-01-14 ENCOUNTER — OUTPATIENT (OUTPATIENT)
Dept: OUTPATIENT SERVICES | Facility: HOSPITAL | Age: 34
LOS: 1 days | End: 2019-01-14

## 2019-01-14 ENCOUNTER — APPOINTMENT (OUTPATIENT)
Dept: OBGYN | Facility: HOSPITAL | Age: 34
End: 2019-01-14
Payer: MEDICAID

## 2019-01-14 VITALS — WEIGHT: 128 LBS | SYSTOLIC BLOOD PRESSURE: 125 MMHG | BODY MASS INDEX: 27.7 KG/M2 | DIASTOLIC BLOOD PRESSURE: 74 MMHG

## 2019-01-14 DIAGNOSIS — O09.91 SUPERVISION OF HIGH RISK PREGNANCY, UNSPECIFIED, FIRST TRIMESTER: ICD-10-CM

## 2019-01-14 DIAGNOSIS — Z30.9 ENCOUNTER FOR CONTRACEPTIVE MANAGEMENT, UNSPECIFIED: ICD-10-CM

## 2019-01-14 DIAGNOSIS — Z87.42 PERSONAL HISTORY OF OTHER DISEASES OF THE FEMALE GENITAL TRACT: ICD-10-CM

## 2019-01-14 DIAGNOSIS — O09.92 SUPERVISION OF HIGH RISK PREGNANCY, UNSPECIFIED, SECOND TRIMESTER: ICD-10-CM

## 2019-01-14 DIAGNOSIS — Z98.890 OTHER SPECIFIED POSTPROCEDURAL STATES: Chronic | ICD-10-CM

## 2019-01-14 DIAGNOSIS — Z30.09 ENCOUNTER FOR OTHER GENERAL COUNSELING AND ADVICE ON CONTRACEPTION: ICD-10-CM

## 2019-01-14 DIAGNOSIS — G40.909 EPILEPSY, UNSPECIFIED, NOT INTRACTABLE, WITHOUT STATUS EPILEPTICUS: ICD-10-CM

## 2019-01-14 DIAGNOSIS — O24.419 GESTATIONAL DIABETES MELLITUS IN PREGNANCY, UNSPECIFIED CONTROL: ICD-10-CM

## 2019-01-14 DIAGNOSIS — G80.9 CEREBRAL PALSY, UNSPECIFIED: ICD-10-CM

## 2019-01-14 DIAGNOSIS — Z01.419 ENCOUNTER FOR GYNECOLOGICAL EXAMINATION (GENERAL) (ROUTINE) W/OUT ABNORMAL FINDINGS: ICD-10-CM

## 2019-01-14 LAB — GLUCOSE BLDC GLUCOMTR-MCNC: 193

## 2019-01-14 PROCEDURE — 81003 URINALYSIS AUTO W/O SCOPE: CPT | Mod: NC,GC,QW

## 2019-01-14 PROCEDURE — 99214 OFFICE O/P EST MOD 30 MIN: CPT | Mod: 25,GC

## 2019-01-15 ENCOUNTER — NON-APPOINTMENT (OUTPATIENT)
Age: 34
End: 2019-01-15

## 2019-01-17 ENCOUNTER — NON-APPOINTMENT (OUTPATIENT)
Age: 34
End: 2019-01-17

## 2019-01-17 ENCOUNTER — CHART COPY (OUTPATIENT)
Age: 34
End: 2019-01-17

## 2019-01-17 RX ORDER — ELECTROLYTES/DEXTROSE
32G X 4 MM SOLUTION, ORAL ORAL
Qty: 1 | Refills: 2 | Status: DISCONTINUED | COMMUNITY
Start: 2019-01-14 | End: 2019-01-17

## 2019-01-17 RX ORDER — INSULIN GLARGINE 100 [IU]/ML
100 INJECTION, SOLUTION SUBCUTANEOUS AT BEDTIME
Qty: 1 | Refills: 1 | Status: DISCONTINUED | COMMUNITY
Start: 2019-01-17 | End: 2019-01-17

## 2019-01-17 RX ORDER — ISOPROPYL ALCOHOL 70 ML/100ML
SWAB TOPICAL
Qty: 1 | Refills: 2 | Status: DISCONTINUED | COMMUNITY
Start: 2019-01-14 | End: 2019-01-17

## 2019-01-17 RX ORDER — INSULIN GLARGINE 100 [IU]/ML
100 INJECTION, SOLUTION SUBCUTANEOUS AT BEDTIME
Qty: 1 | Refills: 3 | Status: DISCONTINUED | COMMUNITY
Start: 2019-01-14 | End: 2019-01-17

## 2019-01-17 RX ORDER — INSULIN LISPRO 100 [IU]/ML
100 INJECTION, SOLUTION INTRAVENOUS; SUBCUTANEOUS 3 TIMES DAILY
Qty: 1 | Refills: 3 | Status: DISCONTINUED | COMMUNITY
Start: 2019-01-14 | End: 2019-01-17

## 2019-01-22 ENCOUNTER — OUTPATIENT (OUTPATIENT)
Dept: OUTPATIENT SERVICES | Age: 34
LOS: 1 days | Discharge: HOME | End: 2019-01-22

## 2019-01-22 DIAGNOSIS — Z98.890 OTHER SPECIFIED POSTPROCEDURAL STATES: Chronic | ICD-10-CM

## 2019-01-23 ENCOUNTER — APPOINTMENT (OUTPATIENT)
Dept: PEDIATRIC CARDIOLOGY | Facility: CLINIC | Age: 34
End: 2019-01-23

## 2019-01-23 ENCOUNTER — APPOINTMENT (OUTPATIENT)
Dept: OBGYN | Facility: HOSPITAL | Age: 34
End: 2019-01-23

## 2019-01-28 ENCOUNTER — NON-APPOINTMENT (OUTPATIENT)
Age: 34
End: 2019-01-28

## 2019-01-28 ENCOUNTER — APPOINTMENT (OUTPATIENT)
Dept: OBGYN | Facility: HOSPITAL | Age: 34
End: 2019-01-28
Payer: MEDICAID

## 2019-01-28 ENCOUNTER — OUTPATIENT (OUTPATIENT)
Dept: OUTPATIENT SERVICES | Facility: HOSPITAL | Age: 34
LOS: 1 days | End: 2019-01-28

## 2019-01-28 VITALS — BODY MASS INDEX: 28.35 KG/M2 | SYSTOLIC BLOOD PRESSURE: 124 MMHG | DIASTOLIC BLOOD PRESSURE: 82 MMHG | WEIGHT: 131 LBS

## 2019-01-28 DIAGNOSIS — J45.909 UNSPECIFIED ASTHMA, UNCOMPLICATED: ICD-10-CM

## 2019-01-28 DIAGNOSIS — Z98.890 OTHER SPECIFIED POSTPROCEDURAL STATES: Chronic | ICD-10-CM

## 2019-01-28 DIAGNOSIS — O09.90 SUPERVISION OF HIGH RISK PREGNANCY, UNSPECIFIED, UNSPECIFIED TRIMESTER: ICD-10-CM

## 2019-01-28 DIAGNOSIS — G80.9 CEREBRAL PALSY, UNSPECIFIED: ICD-10-CM

## 2019-01-28 DIAGNOSIS — G40.909 EPILEPSY, UNSPECIFIED, NOT INTRACTABLE, WITHOUT STATUS EPILEPTICUS: ICD-10-CM

## 2019-01-28 DIAGNOSIS — O24.919 UNSPECIFIED DIABETES MELLITUS IN PREGNANCY, UNSPECIFIED TRIMESTER: ICD-10-CM

## 2019-01-28 LAB — GLUCOSE BLDC GLUCOMTR-MCNC: 225

## 2019-01-28 PROCEDURE — 99214 OFFICE O/P EST MOD 30 MIN: CPT | Mod: 25,GC

## 2019-01-28 PROCEDURE — 81003 URINALYSIS AUTO W/O SCOPE: CPT | Mod: NC,GC,QW

## 2019-02-05 ENCOUNTER — NON-APPOINTMENT (OUTPATIENT)
Age: 34
End: 2019-02-05

## 2019-02-05 ENCOUNTER — APPOINTMENT (OUTPATIENT)
Dept: OBGYN | Facility: HOSPITAL | Age: 34
End: 2019-02-05

## 2019-02-05 ENCOUNTER — OUTPATIENT (OUTPATIENT)
Dept: OUTPATIENT SERVICES | Facility: HOSPITAL | Age: 34
LOS: 1 days | End: 2019-02-05

## 2019-02-05 ENCOUNTER — APPOINTMENT (OUTPATIENT)
Dept: PEDIATRIC CARDIOLOGY | Facility: CLINIC | Age: 34
End: 2019-02-05
Payer: MEDICAID

## 2019-02-05 VITALS
DIASTOLIC BLOOD PRESSURE: 62 MMHG | SYSTOLIC BLOOD PRESSURE: 122 MMHG | WEIGHT: 131.06 LBS | BODY MASS INDEX: 28.36 KG/M2

## 2019-02-05 DIAGNOSIS — O24.919 UNSPECIFIED DIABETES MELLITUS IN PREGNANCY, UNSPECIFIED TRIMESTER: ICD-10-CM

## 2019-02-05 DIAGNOSIS — O09.90 SUPERVISION OF HIGH RISK PREGNANCY, UNSPECIFIED, UNSPECIFIED TRIMESTER: ICD-10-CM

## 2019-02-05 DIAGNOSIS — G80.9 CEREBRAL PALSY, UNSPECIFIED: ICD-10-CM

## 2019-02-05 DIAGNOSIS — O24.419 GESTATIONAL DIABETES MELLITUS IN PREGNANCY, UNSPECIFIED CONTROL: ICD-10-CM

## 2019-02-05 DIAGNOSIS — Z98.890 OTHER SPECIFIED POSTPROCEDURAL STATES: Chronic | ICD-10-CM

## 2019-02-05 DIAGNOSIS — O99.519 DISEASES OF THE RESPIRATORY SYSTEM COMPLICATING PREGNANCY, UNSPECIFIED TRIMESTER: ICD-10-CM

## 2019-02-05 PROCEDURE — 76827 ECHO EXAM OF FETAL HEART: CPT | Mod: 26

## 2019-02-05 PROCEDURE — 76825 ECHO EXAM OF FETAL HEART: CPT | Mod: 26

## 2019-02-05 PROCEDURE — 99202 OFFICE O/P NEW SF 15 MIN: CPT | Mod: 25

## 2019-02-05 PROCEDURE — 76821 MIDDLE CEREBRAL ARTERY ECHO: CPT | Mod: 26

## 2019-02-05 PROCEDURE — 93325 DOPPLER ECHO COLOR FLOW MAPG: CPT | Mod: 26,59

## 2019-02-05 PROCEDURE — 76820 UMBILICAL ARTERY ECHO: CPT | Mod: 26

## 2019-02-07 DIAGNOSIS — O24.919 UNSPECIFIED DIABETES MELLITUS IN PREGNANCY, UNSPECIFIED TRIMESTER: ICD-10-CM

## 2019-02-13 ENCOUNTER — APPOINTMENT (OUTPATIENT)
Dept: ANTEPARTUM | Facility: CLINIC | Age: 34
End: 2019-02-13

## 2019-02-25 ENCOUNTER — LABORATORY RESULT (OUTPATIENT)
Age: 34
End: 2019-02-25

## 2019-02-25 ENCOUNTER — NON-APPOINTMENT (OUTPATIENT)
Age: 34
End: 2019-02-25

## 2019-02-25 ENCOUNTER — APPOINTMENT (OUTPATIENT)
Dept: ANTEPARTUM | Facility: CLINIC | Age: 34
End: 2019-02-25
Payer: MEDICAID

## 2019-02-25 ENCOUNTER — APPOINTMENT (OUTPATIENT)
Dept: OBGYN | Facility: HOSPITAL | Age: 34
End: 2019-02-25
Payer: MEDICAID

## 2019-02-25 ENCOUNTER — OUTPATIENT (OUTPATIENT)
Dept: OUTPATIENT SERVICES | Facility: HOSPITAL | Age: 34
LOS: 1 days | End: 2019-02-25

## 2019-02-25 ENCOUNTER — ASOB RESULT (OUTPATIENT)
Age: 34
End: 2019-02-25

## 2019-02-25 VITALS
SYSTOLIC BLOOD PRESSURE: 100 MMHG | DIASTOLIC BLOOD PRESSURE: 70 MMHG | WEIGHT: 135.14 LBS | BODY MASS INDEX: 29.24 KG/M2

## 2019-02-25 DIAGNOSIS — Z98.890 OTHER SPECIFIED POSTPROCEDURAL STATES: Chronic | ICD-10-CM

## 2019-02-25 DIAGNOSIS — R81 GLYCOSURIA: ICD-10-CM

## 2019-02-25 DIAGNOSIS — O24.419 GESTATIONAL DIABETES MELLITUS IN PREGNANCY, UNSPECIFIED CONTROL: ICD-10-CM

## 2019-02-25 LAB
BASOPHILS # BLD AUTO: 0.04 K/UL — SIGNIFICANT CHANGE UP (ref 0–0.2)
BASOPHILS NFR BLD AUTO: 0.5 % — SIGNIFICANT CHANGE UP (ref 0–2)
EOSINOPHIL # BLD AUTO: 0.23 K/UL — SIGNIFICANT CHANGE UP (ref 0–0.5)
EOSINOPHIL NFR BLD AUTO: 2.7 % — SIGNIFICANT CHANGE UP (ref 0–6)
HCT VFR BLD CALC: 40.2 % — SIGNIFICANT CHANGE UP (ref 34.5–45)
HGB BLD-MCNC: 12.5 G/DL — SIGNIFICANT CHANGE UP (ref 11.5–15.5)
IMM GRANULOCYTES NFR BLD AUTO: 0.9 % — SIGNIFICANT CHANGE UP (ref 0–1.5)
LYMPHOCYTES # BLD AUTO: 1.25 K/UL — SIGNIFICANT CHANGE UP (ref 1–3.3)
LYMPHOCYTES # BLD AUTO: 14.7 % — SIGNIFICANT CHANGE UP (ref 13–44)
MCHC RBC-ENTMCNC: 29.5 PG — SIGNIFICANT CHANGE UP (ref 27–34)
MCHC RBC-ENTMCNC: 31.1 % — LOW (ref 32–36)
MCV RBC AUTO: 94.8 FL — SIGNIFICANT CHANGE UP (ref 80–100)
MONOCYTES # BLD AUTO: 0.87 K/UL — SIGNIFICANT CHANGE UP (ref 0–0.9)
MONOCYTES NFR BLD AUTO: 10.2 % — SIGNIFICANT CHANGE UP (ref 2–14)
NEUTROPHILS # BLD AUTO: 6.05 K/UL — SIGNIFICANT CHANGE UP (ref 1.8–7.4)
NEUTROPHILS NFR BLD AUTO: 71 % — SIGNIFICANT CHANGE UP (ref 43–77)
NRBC # FLD: 0 K/UL — LOW (ref 25–125)
PLATELET # BLD AUTO: 216 K/UL — SIGNIFICANT CHANGE UP (ref 150–400)
PMV BLD: 11.8 FL — SIGNIFICANT CHANGE UP (ref 7–13)
RBC # BLD: 4.24 M/UL — SIGNIFICANT CHANGE UP (ref 3.8–5.2)
RBC # FLD: 13.8 % — SIGNIFICANT CHANGE UP (ref 10.3–14.5)
WBC # BLD: 8.52 K/UL — SIGNIFICANT CHANGE UP (ref 3.8–10.5)
WBC # FLD AUTO: 8.52 K/UL — SIGNIFICANT CHANGE UP (ref 3.8–10.5)

## 2019-02-25 PROCEDURE — 99213 OFFICE O/P EST LOW 20 MIN: CPT | Mod: GC,25

## 2019-02-25 PROCEDURE — 36415H: CUSTOM | Mod: NC,25,GC

## 2019-02-25 PROCEDURE — 76805 OB US >/= 14 WKS SNGL FETUS: CPT | Mod: 26

## 2019-02-25 PROCEDURE — 81003 URINALYSIS AUTO W/O SCOPE: CPT | Mod: NC,GC,QW

## 2019-02-26 DIAGNOSIS — O09.90 SUPERVISION OF HIGH RISK PREGNANCY, UNSPECIFIED, UNSPECIFIED TRIMESTER: ICD-10-CM

## 2019-02-26 DIAGNOSIS — O24.419 GESTATIONAL DIABETES MELLITUS IN PREGNANCY, UNSPECIFIED CONTROL: ICD-10-CM

## 2019-02-26 DIAGNOSIS — G80.9 CEREBRAL PALSY, UNSPECIFIED: ICD-10-CM

## 2019-02-26 DIAGNOSIS — O99.519 DISEASES OF THE RESPIRATORY SYSTEM COMPLICATING PREGNANCY, UNSPECIFIED TRIMESTER: ICD-10-CM

## 2019-02-26 LAB — T PALLIDUM AB TITR SER: NEGATIVE — SIGNIFICANT CHANGE UP

## 2019-02-27 ENCOUNTER — APPOINTMENT (OUTPATIENT)
Dept: PULMONOLOGY | Facility: CLINIC | Age: 34
End: 2019-02-27
Payer: MEDICAID

## 2019-02-27 VITALS
OXYGEN SATURATION: 98 % | DIASTOLIC BLOOD PRESSURE: 85 MMHG | RESPIRATION RATE: 16 BRPM | HEART RATE: 116 BPM | SYSTOLIC BLOOD PRESSURE: 148 MMHG | WEIGHT: 133 LBS | TEMPERATURE: 97.3 F | BODY MASS INDEX: 26.81 KG/M2 | HEIGHT: 59 IN

## 2019-02-27 DIAGNOSIS — K21.9 GASTRO-ESOPHAGEAL REFLUX DISEASE W/OUT ESOPHAGITIS: ICD-10-CM

## 2019-02-27 DIAGNOSIS — O99.519 DISEASES OF THE RESPIRATORY SYSTEM COMPLICATING PREGNANCY, UNSPECIFIED TRIMESTER: ICD-10-CM

## 2019-02-27 DIAGNOSIS — J45.909 DISEASES OF THE RESPIRATORY SYSTEM COMPLICATING PREGNANCY, UNSPECIFIED TRIMESTER: ICD-10-CM

## 2019-02-27 PROCEDURE — 99203 OFFICE O/P NEW LOW 30 MIN: CPT | Mod: GC

## 2019-02-27 NOTE — PHYSICAL EXAM
[General Appearance - Well Developed] : well developed [General Appearance - Well Nourished] : well nourished [Normal Conjunctiva] : the conjunctiva exhibited no abnormalities [Normal Oropharynx] : normal oropharynx [Neck Appearance] : the appearance of the neck was normal [Heart Rate And Rhythm] : heart rate and rhythm were normal [Heart Sounds] : normal S1 and S2 [] : no respiratory distress [Respiration, Rhythm And Depth] : normal respiratory rhythm and effort [Auscultation Breath Sounds / Voice Sounds] : lungs were clear to auscultation bilaterally [Abnormal Walk] : normal gait [Nail Clubbing] : no clubbing of the fingernails [Cyanosis, Localized] : no localized cyanosis [FreeTextEntry1] : gravid, non-tendfer

## 2019-02-27 NOTE — REVIEW OF SYSTEMS
[Cough] : cough [Sputum] : sputum  [Dyspnea] : dyspnea [Wheezing] : wheezing [Heartburn] : heartburn [Reflux] : reflux [Diabetes] : diabetes mellitus [Negative] : Hematologic [Fever] : no fever [Chills] : no chills [Dry Eyes] : no dryness of the eyes [Eye Irritation] : no ~T irritation of the eyes [Postnasal Drip] : no postnasal drip [Chest Discomfort] : no chest discomfort [Orthopnea] : no orthopnea [Itchy Eyes] : no itching of ~T the eyes [Watery Eyes] : no discharge from the eyes [Constipation] : no constipation [Diarrhea] : no diarrhea [Nocturia] : no nocturia [Headache] : no headache [Dizziness] : no dizziness [Depression] : no depression [FreeTextEntry5] : gravid

## 2019-02-27 NOTE — ASSESSMENT
[FreeTextEntry1] : 34F with history of cerebral palsy,  6months pregnant c/b gestational DM, reported history of childhood asthma presents with cough x2 months. Patient received albuterol for presumed asthma during pregnancy; patient needed significant directions to use albuterol. Symptoms may have a component of reactive airways but acid reflux 2/2 pregnancy is playing a significant role in her symptoms. \par \par -Reasonable to continue albuterol given perceived benefit by patient\par -Inhaler technique reviewed with patient and spacer provided \par -Recommend antacid for acid reflux symptoms\par -If symptoms persist can discuss addition of H2RA\par -If patient has worsening symptoms during pregnancy or symptoms persist after pregnancy she will RTC for evaluation

## 2019-02-27 NOTE — HISTORY OF PRESENT ILLNESS
[FreeTextEntry1] : 34F with history of CP,  6months pregnant c/b gestational DM, reported childhood asthma (reports hospitalization when she was 7), reports w/ coughing that started in early January when it got cold, triggered by cold and dry air. Symptoms are severely increased by new acid reflux that has started during pregnancy resulting in several episodes of post-tussive emesis.  Symptoms also worse at night. Was given albuterol for presumed asthma--believes albuterol helps somewhat with the coughing.

## 2019-02-28 ENCOUNTER — NON-APPOINTMENT (OUTPATIENT)
Age: 34
End: 2019-02-28

## 2019-03-11 ENCOUNTER — ASOB RESULT (OUTPATIENT)
Age: 34
End: 2019-03-11

## 2019-03-11 ENCOUNTER — NON-APPOINTMENT (OUTPATIENT)
Age: 34
End: 2019-03-11

## 2019-03-11 ENCOUNTER — APPOINTMENT (OUTPATIENT)
Dept: OBGYN | Facility: HOSPITAL | Age: 34
End: 2019-03-11
Payer: MEDICAID

## 2019-03-11 ENCOUNTER — APPOINTMENT (OUTPATIENT)
Dept: ANTEPARTUM | Facility: CLINIC | Age: 34
End: 2019-03-11
Payer: MEDICAID

## 2019-03-11 ENCOUNTER — OUTPATIENT (OUTPATIENT)
Dept: OUTPATIENT SERVICES | Facility: HOSPITAL | Age: 34
LOS: 1 days | End: 2019-03-11
Payer: MEDICAID

## 2019-03-11 ENCOUNTER — OUTPATIENT (OUTPATIENT)
Dept: OUTPATIENT SERVICES | Facility: HOSPITAL | Age: 34
LOS: 1 days | End: 2019-03-11

## 2019-03-11 VITALS
SYSTOLIC BLOOD PRESSURE: 126 MMHG | HEART RATE: 105 BPM | BODY MASS INDEX: 27.53 KG/M2 | WEIGHT: 136.56 LBS | HEIGHT: 59 IN | DIASTOLIC BLOOD PRESSURE: 78 MMHG

## 2019-03-11 VITALS — TEMPERATURE: 97 F

## 2019-03-11 DIAGNOSIS — Z3A.00 WEEKS OF GESTATION OF PREGNANCY NOT SPECIFIED: ICD-10-CM

## 2019-03-11 DIAGNOSIS — Z98.890 OTHER SPECIFIED POSTPROCEDURAL STATES: Chronic | ICD-10-CM

## 2019-03-11 DIAGNOSIS — O26.899 OTHER SPECIFIED PREGNANCY RELATED CONDITIONS, UNSPECIFIED TRIMESTER: ICD-10-CM

## 2019-03-11 DIAGNOSIS — O24.419 GESTATIONAL DIABETES MELLITUS IN PREGNANCY, UNSPECIFIED CONTROL: ICD-10-CM

## 2019-03-11 DIAGNOSIS — O09.93 SUPERVISION OF HIGH RISK PREGNANCY, UNSPECIFIED, THIRD TRIMESTER: ICD-10-CM

## 2019-03-11 LAB
ALBUMIN SERPL ELPH-MCNC: 3.5 G/DL — SIGNIFICANT CHANGE UP (ref 3.3–5)
ALP SERPL-CCNC: 218 U/L — HIGH (ref 40–120)
ALT FLD-CCNC: 15 U/L — SIGNIFICANT CHANGE UP (ref 4–33)
ANION GAP SERPL CALC-SCNC: 14 MMO/L — SIGNIFICANT CHANGE UP (ref 7–14)
APPEARANCE UR: SIGNIFICANT CHANGE UP
APTT BLD: 26.2 SEC — LOW (ref 27.5–36.3)
AST SERPL-CCNC: 18 U/L — SIGNIFICANT CHANGE UP (ref 4–32)
BACTERIA # UR AUTO: NEGATIVE — SIGNIFICANT CHANGE UP
BASOPHILS # BLD AUTO: 0.03 K/UL — SIGNIFICANT CHANGE UP (ref 0–0.2)
BASOPHILS NFR BLD AUTO: 0.3 % — SIGNIFICANT CHANGE UP (ref 0–2)
BILIRUB SERPL-MCNC: 0.4 MG/DL — SIGNIFICANT CHANGE UP (ref 0.2–1.2)
BILIRUB UR-MCNC: NEGATIVE — SIGNIFICANT CHANGE UP
BLOOD UR QL VISUAL: NEGATIVE — SIGNIFICANT CHANGE UP
BUN SERPL-MCNC: 11 MG/DL — SIGNIFICANT CHANGE UP (ref 7–23)
CALCIUM SERPL-MCNC: 10.4 MG/DL — SIGNIFICANT CHANGE UP (ref 8.4–10.5)
CHLORIDE SERPL-SCNC: 104 MMOL/L — SIGNIFICANT CHANGE UP (ref 98–107)
CO2 SERPL-SCNC: 20 MMOL/L — LOW (ref 22–31)
COLOR SPEC: YELLOW — SIGNIFICANT CHANGE UP
CREAT ?TM UR-MCNC: 168.6 MG/DL — SIGNIFICANT CHANGE UP
CREAT SERPL-MCNC: 0.59 MG/DL — SIGNIFICANT CHANGE UP (ref 0.5–1.3)
EOSINOPHIL # BLD AUTO: 0.11 K/UL — SIGNIFICANT CHANGE UP (ref 0–0.5)
EOSINOPHIL NFR BLD AUTO: 1.3 % — SIGNIFICANT CHANGE UP (ref 0–6)
FIBRINOGEN PPP-MCNC: 627.5 MG/DL — HIGH (ref 350–510)
GLUCOSE SERPL-MCNC: 103 MG/DL — HIGH (ref 70–99)
GLUCOSE UR-MCNC: NEGATIVE — SIGNIFICANT CHANGE UP
HCT VFR BLD CALC: 38.4 % — SIGNIFICANT CHANGE UP (ref 34.5–45)
HGB BLD-MCNC: 12.2 G/DL — SIGNIFICANT CHANGE UP (ref 11.5–15.5)
HYALINE CASTS # UR AUTO: SIGNIFICANT CHANGE UP
IMM GRANULOCYTES NFR BLD AUTO: 0.8 % — SIGNIFICANT CHANGE UP (ref 0–1.5)
INR BLD: 1.01 — SIGNIFICANT CHANGE UP (ref 0.88–1.17)
KETONES UR-MCNC: SIGNIFICANT CHANGE UP
LDH SERPL L TO P-CCNC: 204 U/L — SIGNIFICANT CHANGE UP (ref 135–225)
LEUKOCYTE ESTERASE UR-ACNC: SIGNIFICANT CHANGE UP
LYMPHOCYTES # BLD AUTO: 1.2 K/UL — SIGNIFICANT CHANGE UP (ref 1–3.3)
LYMPHOCYTES # BLD AUTO: 13.9 % — SIGNIFICANT CHANGE UP (ref 13–44)
MCHC RBC-ENTMCNC: 29.5 PG — SIGNIFICANT CHANGE UP (ref 27–34)
MCHC RBC-ENTMCNC: 31.8 % — LOW (ref 32–36)
MCV RBC AUTO: 92.8 FL — SIGNIFICANT CHANGE UP (ref 80–100)
MONOCYTES # BLD AUTO: 0.87 K/UL — SIGNIFICANT CHANGE UP (ref 0–0.9)
MONOCYTES NFR BLD AUTO: 10 % — SIGNIFICANT CHANGE UP (ref 2–14)
NEUTROPHILS # BLD AUTO: 6.38 K/UL — SIGNIFICANT CHANGE UP (ref 1.8–7.4)
NEUTROPHILS NFR BLD AUTO: 73.7 % — SIGNIFICANT CHANGE UP (ref 43–77)
NITRITE UR-MCNC: NEGATIVE — SIGNIFICANT CHANGE UP
NRBC # FLD: 0 K/UL — LOW (ref 25–125)
PH UR: 6 — SIGNIFICANT CHANGE UP (ref 5–8)
PLATELET # BLD AUTO: 194 K/UL — SIGNIFICANT CHANGE UP (ref 150–400)
PMV BLD: 11.4 FL — SIGNIFICANT CHANGE UP (ref 7–13)
POTASSIUM SERPL-MCNC: 4.5 MMOL/L — SIGNIFICANT CHANGE UP (ref 3.5–5.3)
POTASSIUM SERPL-SCNC: 4.5 MMOL/L — SIGNIFICANT CHANGE UP (ref 3.5–5.3)
PROT SERPL-MCNC: 6.5 G/DL — SIGNIFICANT CHANGE UP (ref 6–8.3)
PROT UR-MCNC: 30 — SIGNIFICANT CHANGE UP
PROT UR-MCNC: 43.5 MG/DL — SIGNIFICANT CHANGE UP
PROTHROM AB SERPL-ACNC: 11.2 SEC — SIGNIFICANT CHANGE UP (ref 9.8–13.1)
RBC # BLD: 4.14 M/UL — SIGNIFICANT CHANGE UP (ref 3.8–5.2)
RBC # FLD: 13.9 % — SIGNIFICANT CHANGE UP (ref 10.3–14.5)
RBC CASTS # UR COMP ASSIST: SIGNIFICANT CHANGE UP (ref 0–?)
SODIUM SERPL-SCNC: 138 MMOL/L — SIGNIFICANT CHANGE UP (ref 135–145)
SP GR SPEC: 1.03 — SIGNIFICANT CHANGE UP (ref 1–1.04)
SQUAMOUS # UR AUTO: SIGNIFICANT CHANGE UP
URATE CRY FLD QL MICRO: SIGNIFICANT CHANGE UP (ref 0–0)
URATE SERPL-MCNC: 4.7 MG/DL — SIGNIFICANT CHANGE UP (ref 2.5–7)
UROBILINOGEN FLD QL: NORMAL — SIGNIFICANT CHANGE UP
WBC # BLD: 8.66 K/UL — SIGNIFICANT CHANGE UP (ref 3.8–10.5)
WBC # FLD AUTO: 8.66 K/UL — SIGNIFICANT CHANGE UP (ref 3.8–10.5)
WBC UR QL: HIGH (ref 0–?)

## 2019-03-11 PROCEDURE — 76816 OB US FOLLOW-UP PER FETUS: CPT | Mod: 26

## 2019-03-11 PROCEDURE — 59025 FETAL NON-STRESS TEST: CPT | Mod: 26

## 2019-03-11 PROCEDURE — 99213 OFFICE O/P EST LOW 20 MIN: CPT | Mod: GC,25

## 2019-03-11 PROCEDURE — 76819 FETAL BIOPHYS PROFIL W/O NST: CPT | Mod: 26

## 2019-03-11 PROCEDURE — 93010 ELECTROCARDIOGRAM REPORT: CPT

## 2019-03-12 LAB — SPECIMEN SOURCE: SIGNIFICANT CHANGE UP

## 2019-03-13 LAB — BACTERIA UR CULT: SIGNIFICANT CHANGE UP

## 2019-03-25 ENCOUNTER — APPOINTMENT (OUTPATIENT)
Dept: ANTEPARTUM | Facility: HOSPITAL | Age: 34
End: 2019-03-25
Payer: MEDICAID

## 2019-03-25 ENCOUNTER — APPOINTMENT (OUTPATIENT)
Dept: OBGYN | Facility: HOSPITAL | Age: 34
End: 2019-03-25

## 2019-03-25 ENCOUNTER — CHART COPY (OUTPATIENT)
Age: 34
End: 2019-03-25

## 2019-03-25 ENCOUNTER — OUTPATIENT (OUTPATIENT)
Dept: OUTPATIENT SERVICES | Facility: HOSPITAL | Age: 34
LOS: 1 days | End: 2019-03-25

## 2019-03-25 ENCOUNTER — ASOB RESULT (OUTPATIENT)
Age: 34
End: 2019-03-25

## 2019-03-25 DIAGNOSIS — Z98.890 OTHER SPECIFIED POSTPROCEDURAL STATES: Chronic | ICD-10-CM

## 2019-03-25 PROCEDURE — 76818 FETAL BIOPHYS PROFILE W/NST: CPT | Mod: 26

## 2019-03-26 ENCOUNTER — APPOINTMENT (OUTPATIENT)
Dept: NEUROLOGY | Facility: HOSPITAL | Age: 34
End: 2019-03-26

## 2019-03-26 ENCOUNTER — OUTPATIENT (OUTPATIENT)
Dept: OUTPATIENT SERVICES | Facility: HOSPITAL | Age: 34
LOS: 1 days | End: 2019-03-26
Payer: MEDICAID

## 2019-03-26 VITALS
BODY MASS INDEX: 29.12 KG/M2 | HEART RATE: 110 BPM | DIASTOLIC BLOOD PRESSURE: 77 MMHG | WEIGHT: 135 LBS | SYSTOLIC BLOOD PRESSURE: 128 MMHG | HEIGHT: 57 IN

## 2019-03-26 DIAGNOSIS — G80.9 CEREBRAL PALSY, UNSPECIFIED: ICD-10-CM

## 2019-03-26 DIAGNOSIS — R56.9 UNSPECIFIED CONVULSIONS: ICD-10-CM

## 2019-03-26 DIAGNOSIS — Z98.890 OTHER SPECIFIED POSTPROCEDURAL STATES: Chronic | ICD-10-CM

## 2019-03-26 PROCEDURE — G0463: CPT

## 2019-03-26 NOTE — ASSESSMENT
[FreeTextEntry1] : 34 year old female with history of CP who had an episode of loss of consciousness 4 years ago suspicious for a seizure however VPA was stopped at that time due to negative EEG. Patient states that she has never had a seizure. Unclear is VPA used for mood stabilization rather than seizures. \par Would need more information going forward to determine whether or not she does in fact have epilepsy as she is pregnant/may be breast-feeding and this would . \par \par PLAN\par 1. EEG (ambulatory 24 hour study)\par 2. No anti-epileptic medications for now. \par 3. Depending on EEG, will decide whether or not to obtain imaging. \par 4. Follow up in 1 month to review EEG findings with neurology clinic. \par 5. Plan discussed with Dr. Nissenbaum.

## 2019-03-26 NOTE — PHYSICAL EXAM
[General Appearance - Alert] : alert [General Appearance - In No Acute Distress] : in no acute distress [General Appearance - Well Nourished] : well nourished [Cranial Nerves Optic (II)] : visual acuity intact bilaterally,  visual fields full to confrontation, pupils equal round and reactive to light [Cranial Nerves Oculomotor (III)] : extraocular motion intact [Cranial Nerves Facial (VII)] : face symmetrical [Cranial Nerves Vestibulocochlear (VIII)] : hearing was intact bilaterally [Motor Tone] : muscle tone was normal in all four extremities [Motor Strength] : muscle strength was normal in all four extremities [Involuntary Movements] : no involuntary movements were seen [Sensation Tactile Decrease] : light touch was intact [Sclera] : the sclera and conjunctiva were normal [PERRL With Normal Accommodation] : pupils were equal in size, round, reactive to light, with normal accommodation [Extraocular Movements] : extraocular movements were intact [Full Visual Field] : full visual field [Hearing Threshold Finger Rub Not [place]] : hearing was normal [] : no respiratory distress [Exaggerated Use Of Accessory Muscles For Inspiration] : no accessory muscle use [Motor Strength Upper Extremities Bilaterally] : strength was normal in both upper extremities [Motor Strength Lower Extremities Bilaterally] : strength was normal in both lower extremities [Limited Balance] : balance was intact [Past-pointing] : there was no past-pointing [Tremor] : no tremor present [Coordination - Dysmetria Impaired Finger-to-Nose Bilateral] : not present

## 2019-03-26 NOTE — REVIEW OF SYSTEMS
[Feeling Poorly] : not feeling poorly [Feeling Tired] : not feeling tired [Facial Weakness] : no facial weakness [Arm Weakness] : no arm weakness [Hand Weakness] : no hand weakness [Numbness] : no numbness [Tingling] : no tingling [Abnormal Sensation] : no abnormal sensation [Seizures] : no convulsions [Dizziness] : no dizziness [Fainting] : no fainting [Vertigo] : no vertigo [Migraine Headache] : no migraine headache [Tension Headache] : no tension-type headache [Eye Pain] : no eye pain [Eyesight Problems] : no eyesight problems
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
## 2019-03-29 ENCOUNTER — APPOINTMENT (OUTPATIENT)
Dept: NEUROLOGY | Facility: CLINIC | Age: 34
End: 2019-03-29
Payer: MEDICAID

## 2019-03-29 PROCEDURE — 95816 EEG AWAKE AND DROWSY: CPT

## 2019-03-30 PROCEDURE — 95953: CPT

## 2019-04-01 ENCOUNTER — APPOINTMENT (OUTPATIENT)
Dept: OBGYN | Facility: HOSPITAL | Age: 34
End: 2019-04-01

## 2019-04-03 DIAGNOSIS — O28.3 ABNORMAL ULTRASONIC FINDING ON ANTENATAL SCREENING OF MOTHER: ICD-10-CM

## 2019-04-03 DIAGNOSIS — O24.414 GESTATIONAL DIABETES MELLITUS IN PREGNANCY, INSULIN CONTROLLED: ICD-10-CM

## 2019-04-03 DIAGNOSIS — O99.353 DISEASES OF THE NERVOUS SYSTEM COMPLICATING PREGNANCY, THIRD TRIMESTER: ICD-10-CM

## 2019-04-03 DIAGNOSIS — O28.5 ABNORMAL CHROMOSOMAL AND GENETIC FINDING ON ANTENATAL SCREENING OF MOTHER: ICD-10-CM

## 2019-04-03 DIAGNOSIS — O14.03 MILD TO MODERATE PRE-ECLAMPSIA, THIRD TRIMESTER: ICD-10-CM

## 2019-04-15 ENCOUNTER — APPOINTMENT (OUTPATIENT)
Dept: OBGYN | Facility: HOSPITAL | Age: 34
End: 2019-04-15

## 2019-04-22 ENCOUNTER — APPOINTMENT (OUTPATIENT)
Dept: OBGYN | Facility: HOSPITAL | Age: 34
End: 2019-04-22

## 2019-04-22 ENCOUNTER — TRANSCRIPTION ENCOUNTER (OUTPATIENT)
Age: 34
End: 2019-04-22

## 2019-04-22 ENCOUNTER — OUTPATIENT (OUTPATIENT)
Dept: OUTPATIENT SERVICES | Facility: HOSPITAL | Age: 34
LOS: 1 days | End: 2019-04-22

## 2019-04-22 ENCOUNTER — NON-APPOINTMENT (OUTPATIENT)
Age: 34
End: 2019-04-22

## 2019-04-22 ENCOUNTER — INPATIENT (INPATIENT)
Facility: HOSPITAL | Age: 34
LOS: 3 days | Discharge: HOME | End: 2019-04-26
Attending: OBSTETRICS & GYNECOLOGY | Admitting: OBSTETRICS & GYNECOLOGY
Payer: MEDICAID

## 2019-04-22 VITALS
WEIGHT: 137 LBS | DIASTOLIC BLOOD PRESSURE: 96 MMHG | HEIGHT: 57 IN | SYSTOLIC BLOOD PRESSURE: 152 MMHG | BODY MASS INDEX: 29.56 KG/M2

## 2019-04-22 DIAGNOSIS — O26.899 OTHER SPECIFIED PREGNANCY RELATED CONDITIONS, UNSPECIFIED TRIMESTER: ICD-10-CM

## 2019-04-22 DIAGNOSIS — O14.13 SEVERE PRE-ECLAMPSIA, THIRD TRIMESTER: ICD-10-CM

## 2019-04-22 DIAGNOSIS — Z3A.00 WEEKS OF GESTATION OF PREGNANCY NOT SPECIFIED: ICD-10-CM

## 2019-04-22 DIAGNOSIS — Z98.890 OTHER SPECIFIED POSTPROCEDURAL STATES: Chronic | ICD-10-CM

## 2019-04-22 DIAGNOSIS — O09.90 SUPERVISION OF HIGH RISK PREGNANCY, UNSPECIFIED, UNSPECIFIED TRIMESTER: ICD-10-CM

## 2019-04-22 LAB
ALBUMIN SERPL ELPH-MCNC: 3.3 G/DL — SIGNIFICANT CHANGE UP (ref 3.3–5)
ALP SERPL-CCNC: 325 U/L — HIGH (ref 40–120)
ALT FLD-CCNC: 21 U/L — SIGNIFICANT CHANGE UP (ref 4–33)
ANION GAP SERPL CALC-SCNC: 12 MMO/L — SIGNIFICANT CHANGE UP (ref 7–14)
APPEARANCE UR: CLEAR — SIGNIFICANT CHANGE UP
APTT BLD: 27.5 SEC — SIGNIFICANT CHANGE UP (ref 27.5–36.3)
AST SERPL-CCNC: 24 U/L — SIGNIFICANT CHANGE UP (ref 4–32)
BACTERIA # UR AUTO: SIGNIFICANT CHANGE UP
BASOPHILS # BLD AUTO: 0.05 K/UL — SIGNIFICANT CHANGE UP (ref 0–0.2)
BASOPHILS NFR BLD AUTO: 0.8 % — SIGNIFICANT CHANGE UP (ref 0–2)
BILIRUB SERPL-MCNC: 0.5 MG/DL — SIGNIFICANT CHANGE UP (ref 0.2–1.2)
BILIRUB UR-MCNC: NEGATIVE — SIGNIFICANT CHANGE UP
BLD GP AB SCN SERPL QL: NEGATIVE — SIGNIFICANT CHANGE UP
BLOOD UR QL VISUAL: SIGNIFICANT CHANGE UP
BUN SERPL-MCNC: 11 MG/DL — SIGNIFICANT CHANGE UP (ref 7–23)
CALCIUM SERPL-MCNC: 9.9 MG/DL — SIGNIFICANT CHANGE UP (ref 8.4–10.5)
CHLORIDE SERPL-SCNC: 104 MMOL/L — SIGNIFICANT CHANGE UP (ref 98–107)
CO2 SERPL-SCNC: 22 MMOL/L — SIGNIFICANT CHANGE UP (ref 22–31)
COLOR SPEC: YELLOW — SIGNIFICANT CHANGE UP
CREAT ?TM UR-MCNC: 198.8 MG/DL — SIGNIFICANT CHANGE UP
CREAT SERPL-MCNC: 0.83 MG/DL — SIGNIFICANT CHANGE UP (ref 0.5–1.3)
EOSINOPHIL # BLD AUTO: 0.16 K/UL — SIGNIFICANT CHANGE UP (ref 0–0.5)
EOSINOPHIL NFR BLD AUTO: 2.6 % — SIGNIFICANT CHANGE UP (ref 0–6)
FIBRINOGEN PPP-MCNC: 585 MG/DL — HIGH (ref 350–510)
GLUCOSE SERPL-MCNC: 83 MG/DL — SIGNIFICANT CHANGE UP (ref 70–99)
GLUCOSE UR-MCNC: NEGATIVE — SIGNIFICANT CHANGE UP
HCT VFR BLD CALC: 39.7 % — SIGNIFICANT CHANGE UP (ref 34.5–45)
HGB BLD-MCNC: 12.6 G/DL — SIGNIFICANT CHANGE UP (ref 11.5–15.5)
IMM GRANULOCYTES NFR BLD AUTO: 0.5 % — SIGNIFICANT CHANGE UP (ref 0–1.5)
INR BLD: 0.93 — SIGNIFICANT CHANGE UP (ref 0.88–1.17)
KETONES UR-MCNC: NEGATIVE — SIGNIFICANT CHANGE UP
LDH SERPL L TO P-CCNC: 219 U/L — SIGNIFICANT CHANGE UP (ref 135–225)
LEUKOCYTE ESTERASE UR-ACNC: NEGATIVE — SIGNIFICANT CHANGE UP
LYMPHOCYTES # BLD AUTO: 1.13 K/UL — SIGNIFICANT CHANGE UP (ref 1–3.3)
LYMPHOCYTES # BLD AUTO: 18.3 % — SIGNIFICANT CHANGE UP (ref 13–44)
MCHC RBC-ENTMCNC: 30.3 PG — SIGNIFICANT CHANGE UP (ref 27–34)
MCHC RBC-ENTMCNC: 31.7 % — LOW (ref 32–36)
MCV RBC AUTO: 95.4 FL — SIGNIFICANT CHANGE UP (ref 80–100)
MONOCYTES # BLD AUTO: 0.87 K/UL — SIGNIFICANT CHANGE UP (ref 0–0.9)
MONOCYTES NFR BLD AUTO: 14.1 % — HIGH (ref 2–14)
NEUTROPHILS # BLD AUTO: 3.95 K/UL — SIGNIFICANT CHANGE UP (ref 1.8–7.4)
NEUTROPHILS NFR BLD AUTO: 63.7 % — SIGNIFICANT CHANGE UP (ref 43–77)
NITRITE UR-MCNC: NEGATIVE — SIGNIFICANT CHANGE UP
NRBC # FLD: 0 K/UL — SIGNIFICANT CHANGE UP (ref 0–0)
PH UR: 6 — SIGNIFICANT CHANGE UP (ref 5–8)
PLATELET # BLD AUTO: 174 K/UL — SIGNIFICANT CHANGE UP (ref 150–400)
PMV BLD: 11.8 FL — SIGNIFICANT CHANGE UP (ref 7–13)
POTASSIUM SERPL-MCNC: 4.1 MMOL/L — SIGNIFICANT CHANGE UP (ref 3.5–5.3)
POTASSIUM SERPL-SCNC: 4.1 MMOL/L — SIGNIFICANT CHANGE UP (ref 3.5–5.3)
PROT SERPL-MCNC: 6.3 G/DL — SIGNIFICANT CHANGE UP (ref 6–8.3)
PROT UR-MCNC: 372.5 MG/DL — SIGNIFICANT CHANGE UP
PROT UR-MCNC: 600 — HIGH
PROTHROM AB SERPL-ACNC: 10.6 SEC — SIGNIFICANT CHANGE UP (ref 9.8–13.1)
RBC # BLD: 4.16 M/UL — SIGNIFICANT CHANGE UP (ref 3.8–5.2)
RBC # FLD: 13.4 % — SIGNIFICANT CHANGE UP (ref 10.3–14.5)
RBC CASTS # UR COMP ASSIST: SIGNIFICANT CHANGE UP (ref 0–?)
RH IG SCN BLD-IMP: POSITIVE — SIGNIFICANT CHANGE UP
SODIUM SERPL-SCNC: 138 MMOL/L — SIGNIFICANT CHANGE UP (ref 135–145)
SP GR SPEC: 1.02 — SIGNIFICANT CHANGE UP (ref 1–1.04)
SQUAMOUS # UR AUTO: SIGNIFICANT CHANGE UP
URATE SERPL-MCNC: 7.2 MG/DL — HIGH (ref 2.5–7)
UROBILINOGEN FLD QL: NORMAL — SIGNIFICANT CHANGE UP
WBC # BLD: 6.19 K/UL — SIGNIFICANT CHANGE UP (ref 3.8–10.5)
WBC # FLD AUTO: 6.19 K/UL — SIGNIFICANT CHANGE UP (ref 3.8–10.5)
WBC UR QL: HIGH (ref 0–?)

## 2019-04-22 RX ORDER — SODIUM CHLORIDE 9 MG/ML
3 INJECTION INTRAMUSCULAR; INTRAVENOUS; SUBCUTANEOUS EVERY 8 HOURS
Qty: 0 | Refills: 0 | Status: DISCONTINUED | OUTPATIENT
Start: 2019-04-22 | End: 2019-04-22

## 2019-04-22 RX ORDER — HYDROMORPHONE HYDROCHLORIDE 2 MG/ML
1 INJECTION INTRAMUSCULAR; INTRAVENOUS; SUBCUTANEOUS
Qty: 0 | Refills: 0 | Status: DISCONTINUED | OUTPATIENT
Start: 2019-04-22 | End: 2019-04-22

## 2019-04-22 RX ORDER — DEXTROSE 50 % IN WATER 50 %
25 SYRINGE (ML) INTRAVENOUS ONCE
Qty: 0 | Refills: 0 | Status: DISCONTINUED | OUTPATIENT
Start: 2019-04-22 | End: 2019-04-22

## 2019-04-22 RX ORDER — LABETALOL HCL 100 MG
20 TABLET ORAL ONCE
Qty: 0 | Refills: 0 | Status: COMPLETED | OUTPATIENT
Start: 2019-04-22 | End: 2019-04-22

## 2019-04-22 RX ORDER — SODIUM CHLORIDE 9 MG/ML
1000 INJECTION, SOLUTION INTRAVENOUS
Qty: 0 | Refills: 0 | Status: DISCONTINUED | OUTPATIENT
Start: 2019-04-22 | End: 2019-04-22

## 2019-04-22 RX ORDER — ONDANSETRON 8 MG/1
4 TABLET, FILM COATED ORAL ONCE
Qty: 0 | Refills: 0 | Status: DISCONTINUED | OUTPATIENT
Start: 2019-04-22 | End: 2019-04-22

## 2019-04-22 RX ORDER — MAGNESIUM SULFATE 500 MG/ML
2 VIAL (ML) INJECTION
Qty: 40 | Refills: 0 | Status: DISCONTINUED | OUTPATIENT
Start: 2019-04-22 | End: 2019-04-22

## 2019-04-22 RX ORDER — LABETALOL HCL 100 MG
200 TABLET ORAL EVERY 8 HOURS
Qty: 0 | Refills: 0 | Status: DISCONTINUED | OUTPATIENT
Start: 2019-04-22 | End: 2019-04-23

## 2019-04-22 RX ORDER — DEXTROSE 50 % IN WATER 50 %
12.5 SYRINGE (ML) INTRAVENOUS ONCE
Qty: 0 | Refills: 0 | Status: DISCONTINUED | OUTPATIENT
Start: 2019-04-22 | End: 2019-04-22

## 2019-04-22 RX ORDER — DEXTROSE 50 % IN WATER 50 %
15 SYRINGE (ML) INTRAVENOUS ONCE
Qty: 0 | Refills: 0 | Status: DISCONTINUED | OUTPATIENT
Start: 2019-04-22 | End: 2019-04-22

## 2019-04-22 RX ORDER — MAGNESIUM SULFATE 500 MG/ML
4 VIAL (ML) INJECTION ONCE
Qty: 0 | Refills: 0 | Status: COMPLETED | OUTPATIENT
Start: 2019-04-22 | End: 2019-04-22

## 2019-04-22 RX ORDER — METOCLOPRAMIDE HCL 10 MG
10 TABLET ORAL ONCE
Qty: 0 | Refills: 0 | Status: COMPLETED | OUTPATIENT
Start: 2019-04-22 | End: 2019-04-22

## 2019-04-22 RX ORDER — GLUCAGON INJECTION, SOLUTION 0.5 MG/.1ML
1 INJECTION, SOLUTION SUBCUTANEOUS ONCE
Qty: 0 | Refills: 0 | Status: DISCONTINUED | OUTPATIENT
Start: 2019-04-22 | End: 2019-04-22

## 2019-04-22 RX ORDER — HYDROMORPHONE HYDROCHLORIDE 2 MG/ML
0.5 INJECTION INTRAMUSCULAR; INTRAVENOUS; SUBCUTANEOUS
Qty: 0 | Refills: 0 | Status: DISCONTINUED | OUTPATIENT
Start: 2019-04-22 | End: 2019-04-22

## 2019-04-22 RX ORDER — CITRIC ACID/SODIUM CITRATE 300-500 MG
30 SOLUTION, ORAL ORAL ONCE
Qty: 0 | Refills: 0 | Status: COMPLETED | OUTPATIENT
Start: 2019-04-22 | End: 2019-04-22

## 2019-04-22 RX ORDER — FAMOTIDINE 10 MG/ML
20 INJECTION INTRAVENOUS ONCE
Qty: 0 | Refills: 0 | Status: COMPLETED | OUTPATIENT
Start: 2019-04-22 | End: 2019-04-22

## 2019-04-22 RX ADMIN — SODIUM CHLORIDE 50 MILLILITER(S): 9 INJECTION, SOLUTION INTRAVENOUS at 21:07

## 2019-04-22 RX ADMIN — Medication 12 MILLIGRAM(S): at 20:55

## 2019-04-22 RX ADMIN — FAMOTIDINE 20 MILLIGRAM(S): 10 INJECTION INTRAVENOUS at 21:55

## 2019-04-22 RX ADMIN — Medication 30 MILLILITER(S): at 21:55

## 2019-04-22 RX ADMIN — Medication 20 MILLIGRAM(S): at 20:55

## 2019-04-22 RX ADMIN — Medication 50 GM/HR: at 21:06

## 2019-04-22 RX ADMIN — Medication 10 MILLIGRAM(S): at 21:55

## 2019-04-22 RX ADMIN — Medication 300 GRAM(S): at 20:41

## 2019-04-22 NOTE — OB PROVIDER TRIAGE NOTE - NSOBPROVIDERNOTE_OBGYN_ALL_OB_FT
35 y/o  @ 34.3 wks gest presents from PCAP with elevated BP's in clinic today denies any h/a visual disturbances or right upper epigastric pain denies any n/v/d denies any fever or chills ap care  comp by :     T(C): 36.8 (19 @ 15:45), Max: 36.8 (19 @ 15:45)  HR: 102 (19 @ 16:32) (96 - 108)  BP: 121/74 (19 @ 16:32) (121/74 - 172/88)  RR: 18 (19 @ 15:45) (18 - 18)  SpO2: -- 33 y/o  @ 34.3 wks gest presents from PCAP with elevated BP's in clinic today denies any h/a visual disturbances or right upper epigastric pain denies any n/v/d denies any fever or chills ap care  comp by :     T(C): 36.8 (19 @ 15:45), Max: 36.8 (19 @ 15:45)  HR: 102 (19 @ 16:32) (96 - 108)  BP: 121/74 (19 @ 16:32) (121/74 - 172/88)  RR: 18 (19 @ 15:45) (18 - 18)  SpO2: --      PEC labs pending       allergies:  chocolate- anaphylaxis   med hx:   asthma- since childhood albuterol prn   cerebal palsy followed by neurology , pt to have a primary    pt had EEG in 2019 and was normal   surg hx:   lower back sx age 11   gyn hx: denies  ob hx:   denies    current pregnancy complication:   GDMA 2   Humolog 18 units breakfast  Humolog 18 units lunch  Humolog  18 units dinner  baby ASA qd   PNV qd       NST in progress  PEC labs pending  BP monitoring  will continue to monitor 35 y/o  @ 34.3 wks gest presents from PCAP with elevated BP's in clinic today denies any h/a visual disturbances or right upper epigastric pain denies any n/v/d denies any fever or chills ap care  comp by :     T(C): 36.8 (19 @ 15:45), Max: 36.8 (19 @ 15:45)  HR: 102 (19 @ 16:32) (96 - 108)  BP: 121/74 (19 @ 16:32) (121/74 - 172/88)  RR: 18 (19 @ 15:45) (18 - 18)  SpO2: --      PEC labs pending       allergies:  chocolate- anaphylaxis   med hx:   asthma- since childhood albuterol prn   cerebal palsy followed by neurology , pt to have a primary    pt had EEG in 2019 and was normal   surg hx:   lower back sx age 11   gyn hx: denies  ob hx:   denies    current pregnancy complication:   GDMA 2   Humolog 18 units breakfast  Humolog 18 units lunch  Humolog  18 units dinner  baby ASA qd   PNV qd       NST in progress  PEC labs pending  BP monitoring  will continue to monitor        T(C): 36.8 (19 @ 15:45), Max: 36.8 (19 @ 15:45)  HR: 83 (19 @ 20:01) (76 - 108)  BP: 126/91 (19 @ 20:01) (112/65 - 172/88)  RR: 18 (19 @ 15:45) (18 - 18)  SpO2: --    PEC labs:   CBC Full  -  ( 2019 16:53 )  WBC Count : 6.19 K/uL  RBC Count : 4.16 M/uL  Hemoglobin : 12.6 g/dL  Hematocrit : 39.7 %  Platelet Count - Automated : 174 K/uL  Mean Cell Volume : 95.4 fL  Mean Cell Hemoglobin : 30.3 pg  Mean Cell Hemoglobin Concentration : 31.7 %  Auto Neutrophil # : 3.95 K/uL  Auto Lymphocyte # : 1.13 K/uL  Auto Monocyte # : 0.87 K/uL  Auto Eosinophil # : 0.16 K/uL  Auto Basophil # : 0.05 K/uL  Auto Neutrophil % : 63.7 %  Auto Lymphocyte % : 18.3 %  Auto Monocyte % : 14.1 %  Auto Eosinophil % : 2.6 %  Auto Basophil % : 0.8 %    PT/INR - ( 2019 16:53 )   PT: 10.6 SEC;   INR: 0.93          PTT - ( 2019 16:53 )  PTT:27.5 SEC    Urinalysis Basic - ( 2019 16:45 )    Color: YELLOW / Appearance: CLEAR / S.025 / pH: 6.0  Gluc: NEGATIVE / Ketone: NEGATIVE  / Bili: NEGATIVE / Urobili: NORMAL   Blood: TRACE / Protein: 600 / Nitrite: NEGATIVE   Leuk Esterase: NEGATIVE / RBC: 0-2 / WBC 6-10   Sq Epi: MODERATE / Non Sq Epi: x / Bacteria: FEW        cat 1 FHT  toco: irreg     TAS: BPP: 8/8 vtx posterior placenta EFW: 2809 grams MVP: 3.06    FS: 74  addendum:  1945 plan of care d/w dr bar  d/w dr santana  admit to l&D  sPEC @ 34.6 wks gest / betamethasone/ BP monitoring / anesthesia consult   see admission orders 35 y/o  @ 34.3 wks gest presents from PCAP with elevated BP's in clinic today denies any h/a visual disturbances or right upper epigastric pain denies any n/v/d denies any fever or chills ap care  comp by :     T(C): 36.8 (19 @ 15:45), Max: 36.8 (19 @ 15:45)  HR: 102 (19 @ 16:32) (96 - 108)  BP: 121/74 (19 @ 16:32) (121/74 - 172/88)  RR: 18 (19 @ 15:45) (18 - 18)  SpO2: --      PEC labs pending       allergies:  chocolate- anaphylaxis   med hx:   asthma- since childhood albuterol prn   cerebal palsy followed by neurology , pt to have a primary    pt had EEG in 2019 and was normal   surg hx:   lower back sx age 11   gyn hx: denies  ob hx:   denies    current pregnancy complication:   GDMA 2   Humolog 18 units breakfast  Humolog 18 units lunch  Humolog  18 units dinner  baby ASA qd   PNV qd       NST in progress  PEC labs pending  BP monitoring  will continue to monitor        T(C): 36.8 (19 @ 15:45), Max: 36.8 (19 @ 15:45)  HR: 83 (19 @ 20:01) (76 - 108)  BP: 126/91 (19 @ 20:01) (112/65 - 172/88)  RR: 18 (19 @ 15:45) (18 - 18)  SpO2: --    PEC labs:   CBC Full  -  ( 2019 16:53 )  WBC Count : 6.19 K/uL  RBC Count : 4.16 M/uL  Hemoglobin : 12.6 g/dL  Hematocrit : 39.7 %  Platelet Count - Automated : 174 K/uL  Mean Cell Volume : 95.4 fL  Mean Cell Hemoglobin : 30.3 pg  Mean Cell Hemoglobin Concentration : 31.7 %  Auto Neutrophil # : 3.95 K/uL  Auto Lymphocyte # : 1.13 K/uL  Auto Monocyte # : 0.87 K/uL  Auto Eosinophil # : 0.16 K/uL  Auto Basophil # : 0.05 K/uL  Auto Neutrophil % : 63.7 %  Auto Lymphocyte % : 18.3 %  Auto Monocyte % : 14.1 %  Auto Eosinophil % : 2.6 %  Auto Basophil % : 0.8 %    PT/INR - ( 2019 16:53 )   PT: 10.6 SEC;   INR: 0.93          PTT - ( 2019 16:53 )  PTT:27.5 SEC    Urinalysis Basic - ( 2019 16:45 )    Color: YELLOW / Appearance: CLEAR / S.025 / pH: 6.0  Gluc: NEGATIVE / Ketone: NEGATIVE  / Bili: NEGATIVE / Urobili: NORMAL   Blood: TRACE / Protein: 600 / Nitrite: NEGATIVE   Leuk Esterase: NEGATIVE / RBC: 0-2 / WBC 6-10   Sq Epi: MODERATE / Non Sq Epi: x / Bacteria: FEW    creatinine, random urine: 198.80    urinalysis:   blood- trace  protein: 600    CMP:  138/4.1, 104/22, 11/0.83<83  AST: 24  ALT: 21    uric acid: 7.2     LDH: 219        cat 1 FHT  toco: irreg     TAS: BPP: 8/8 vtx posterior placenta EFW: 2809 grams MVP: 3.06    FS: 74  addendum:   plan of care d/w dr bar  d/w dr santana  admit to l&D  sPEC @ 34.6 wks gest / betamethasone/ BP monitoring / anesthesia consult /   see admission orders 35 y/o  @ 34.3 wks gest presents from PCAP with elevated BP's in clinic today denies any h/a visual disturbances or right upper epigastric pain denies any n/v/d denies any fever or chills ap care  comp by :     T(C): 36.8 (19 @ 15:45), Max: 36.8 (19 @ 15:45)  HR: 102 (19 @ 16:32) (96 - 108)  BP: 121/74 (19 @ 16:32) (121/74 - 172/88)  RR: 18 (19 @ 15:45) (18 - 18)  SpO2: --      PEC labs pending       allergies:  chocolate- anaphylaxis   med hx:   asthma- since childhood albuterol prn   cerebal palsy followed by neurology , pt to have a primary    pt had EEG in 2019 and was normal   surg hx:   lower back sx age 11   gyn hx: denies  ob hx:   denies    current pregnancy complication:   GDMA 2   Humolog 18 units breakfast  Humolog 18 units lunch  Humolog  18 units dinner  baby ASA qd   PNV qd       NST in progress  PEC labs pending  BP monitoring  will continue to monitor        T(C): 36.8 (19 @ 15:45), Max: 36.8 (19 @ 15:45)  HR: 83 (19 @ 20:01) (76 - 108)  BP: 126/91 (19 @ 20:01) (112/65 - 172/88)  RR: 18 (19 @ 15:45) (18 - 18)  SpO2: --    PEC labs:   CBC Full  -  ( 2019 16:53 )  WBC Count : 6.19 K/uL  RBC Count : 4.16 M/uL  Hemoglobin : 12.6 g/dL  Hematocrit : 39.7 %  Platelet Count - Automated : 174 K/uL  Mean Cell Volume : 95.4 fL  Mean Cell Hemoglobin : 30.3 pg  Mean Cell Hemoglobin Concentration : 31.7 %  Auto Neutrophil # : 3.95 K/uL  Auto Lymphocyte # : 1.13 K/uL  Auto Monocyte # : 0.87 K/uL  Auto Eosinophil # : 0.16 K/uL  Auto Basophil # : 0.05 K/uL  Auto Neutrophil % : 63.7 %  Auto Lymphocyte % : 18.3 %  Auto Monocyte % : 14.1 %  Auto Eosinophil % : 2.6 %  Auto Basophil % : 0.8 %    PT/INR - ( 2019 16:53 )   PT: 10.6 SEC;   INR: 0.93          PTT - ( 2019 16:53 )  PTT:27.5 SEC    Urinalysis Basic - ( 2019 16:45 )    Color: YELLOW / Appearance: CLEAR / S.025 / pH: 6.0  Gluc: NEGATIVE / Ketone: NEGATIVE  / Bili: NEGATIVE / Urobili: NORMAL   Blood: TRACE / Protein: 600 / Nitrite: NEGATIVE   Leuk Esterase: NEGATIVE / RBC: 0-2 / WBC 6-10   Sq Epi: MODERATE / Non Sq Epi: x / Bacteria: FEW    creatinine, random urine: 198.80    urinalysis:   blood- trace  protein: 600    CMP:  138/4.1, 104/22, 11/0.83<83  AST: 24  ALT: 21    uric acid: 7.2     LDH: 219        cat 1 FHT  toco: irreg     TAS: BPP: 8/8 vtx posterior placenta EFW: 2809 grams MVP: 3.06    FS: 74  addendum:   plan of care d/w dr bar  d/w dr santana  admit to l&D  sPEC @ 34.6 wks gest / betamethasone/ BP monitoring / anesthesia consult / possible    see admission orders

## 2019-04-22 NOTE — CHART NOTE - NSCHARTNOTEFT_GEN_A_CORE
service attending    plan with Dr. Groves    per Dr. Fernandez (anesthesia) GET appropriate for this patient with spinal scar--  in light of patients desire for  section and need for GET and due to high risk status of this patient, MFM deems appropriate to wait for am to proceed with this  section  cat 1 fht, bp well controlled pt received steroid dose #1 and pt not in HELLP  if any of these parameters changes will proceed to  section this pm otherwise will continue to keep pt NPO and proceed with cs in am .    all agreed with plan including, MFM,   attendings, safety officers, anesthesia and nursing=    plan to be discussed with patient and .    Tien SCHWAB

## 2019-04-22 NOTE — PROGRESS NOTE ADULT - SUBJECTIVE AND OBJECTIVE BOX
MFM  Antepartum Note    Patient is a 34y old  nullipara with CP and h./o spine surgery (op report not available but pt has large scar along spine  per anesthesia exam)- sent from clinic with htn and proteinuria. Suspected to have chronic htn, now several severe values which responded to iv labetalol.  Issue is mode /timing delivery.  Pt wants C/S as she was told at young age  by  surgeon that this would be needed. Also she is not candidate for regional- labor epidural or spinal for C/S.   Discussed the above with Opal Boyle ([facility] [place] attng) and DR Fernandez anesthesia- pt will need GA for C/S and anticipated difficult intubation due to CP- C/S planned for tomorrow with full staff. Pt can also benefit from  corticosteroids to be given tonight.    Vital Signs Last 24 Hrs  T(C): 36.5 (2019 21:09), Max: 36.8 (2019 15:45)  T(F): 97.7 (2019 21:09), Max: 98.2 (2019 15:45)  HR: 94 (2019 23:04) (76 - 114)  BP: 140/90 (2019 23:04) (112/65 - 192/97)  BP(mean): --  RR: 16 (2019 21:09) (16 - 18)    LABS:                        12.6   6.19  )-----------( 174      ( 2019 16:53 )             39.7     04-22    138  |  104  |  11  ----------------------------<  83  4.1   |  22  |  0.83    Ca    9.9      2019 16:53    TPro  6.3  /  Alb  3.3  /  TBili  0.5  /  DBili  x   /  AST  24  /  ALT  21  /  AlkPhos  325<H>  04-22    PT/INR - ( 2019 16:53 )   PT: 10.6 SEC;   INR: 0.93          PTT - ( 2019 16:53 )  PTT:27.5 SEC  Urinalysis Basic - ( 2019 16:45 )    Color: YELLOW / Appearance: CLEAR / S.025 / pH: 6.0  Gluc: NEGATIVE / Ketone: NEGATIVE  / Bili: NEGATIVE / Urobili: NORMAL   Blood: TRACE / Protein: 600 / Nitrite: NEGATIVE   Leuk Esterase: NEGATIVE / RBC: 0-2 / WBC 6-10   Sq Epi: MODERATE / Non Sq Epi: x / Bacteria: FEW      A/P: 34y nullipara with CP/spine surgery  at 34+ weeks with superimposed preeclampsia with severe htn - controlled easily with antihypertensives.  No HELLP, reassuring fetal status.  Recommend- betamethasone  Preop for C/S tomorrow  Appreciate anesthesia input  Cont mg sulfate  Control BP

## 2019-04-22 NOTE — OB PROVIDER H&P - ASSESSMENT
33 y/o  @ 34.3 wks gest presents from PCAP with elevated BP's in clinic today denies any h/a visual disturbances or right upper epigastric pain denies any n/v/d denies any fever or chills ap care  comp by :     T(C): 36.8 (19 @ 15:45), Max: 36.8 (19 @ 15:45)  HR: 102 (19 @ 16:32) (96 - 108)  BP: 121/74 (19 @ 16:32) (121/74 - 172/88)  RR: 18 (19 @ 15:45) (18 - 18)  SpO2: --      PEC labs pending       allergies:  chocolate- anaphylaxis   med hx:   asthma- since childhood albuterol prn   cerebal palsy followed by neurology , pt to have a primary    pt had EEG in 2019 and was normal   surg hx:   lower back sx age 11   gyn hx: denies  ob hx:   denies    current pregnancy complication:   GDMA 2   Humolog 18 units breakfast  Humolog 18 units lunch  Humolog  18 units dinner  baby ASA qd   PNV qd       NST in progress  PEC labs pending  BP monitoring  will continue to monitor        T(C): 36.8 (19 @ 15:45), Max: 36.8 (19 @ 15:45)  HR: 83 (19 @ 20:01) (76 - 108)  BP: 126/91 (19 @ 20:01) (112/65 - 172/88)  RR: 18 (19 @ 15:45) (18 - 18)  SpO2: --    PEC labs:   CBC Full  -  ( 2019 16:53 )  WBC Count : 6.19 K/uL  RBC Count : 4.16 M/uL  Hemoglobin : 12.6 g/dL  Hematocrit : 39.7 %  Platelet Count - Automated : 174 K/uL  Mean Cell Volume : 95.4 fL  Mean Cell Hemoglobin : 30.3 pg  Mean Cell Hemoglobin Concentration : 31.7 %  Auto Neutrophil # : 3.95 K/uL  Auto Lymphocyte # : 1.13 K/uL  Auto Monocyte # : 0.87 K/uL  Auto Eosinophil # : 0.16 K/uL  Auto Basophil # : 0.05 K/uL  Auto Neutrophil % : 63.7 %  Auto Lymphocyte % : 18.3 %  Auto Monocyte % : 14.1 %  Auto Eosinophil % : 2.6 %  Auto Basophil % : 0.8 %    PT/INR - ( 2019 16:53 )   PT: 10.6 SEC;   INR: 0.93          PTT - ( 2019 16:53 )  PTT:27.5 SEC    Urinalysis Basic - ( 2019 16:45 )    Color: YELLOW / Appearance: CLEAR / S.025 / pH: 6.0  Gluc: NEGATIVE / Ketone: NEGATIVE  / Bili: NEGATIVE / Urobili: NORMAL   Blood: TRACE / Protein: 600 / Nitrite: NEGATIVE   Leuk Esterase: NEGATIVE / RBC: 0-2 / WBC 6-10   Sq Epi: MODERATE / Non Sq Epi: x / Bacteria: FEW    creatinine, random urine: 198.80    urinalysis:   blood- trace  protein: 600    CMP:  138/4.1, 104/22, 11/0.83<83  AST: 24  ALT: 21    uric acid: 7.2     LDH: 219        cat 1 FHT  toco: irreg     TAS: BPP: 8/8 vtx posterior placenta EFW: 2809 grams MVP: 3.06    FS: 74  addendum:   plan of care d/w dr bar  d/w dr santana  admit to l&D  sPEC @ 34.6 wks gest / betamethasone/ BP monitoring / anesthesia consult / possible  / magnesium sulfate  see admission orders 35 y/o  @ 34.3 wks gest presents from PCAP with elevated BP's in clinic today denies any h/a visual disturbances or right upper epigastric pain denies any n/v/d denies any fever or chills ap care  comp by :     T(C): 36.8 (19 @ 15:45), Max: 36.8 (19 @ 15:45)  HR: 102 (19 @ 16:32) (96 - 108)  BP: 121/74 (19 @ 16:32) (121/74 - 172/88)  RR: 18 (19 @ 15:45) (18 - 18)  SpO2: --      PEC labs pending       allergies:  chocolate- anaphylaxis   med hx:   asthma- since childhood albuterol prn   cerebal palsy followed by neurology , pt to have a primary    pt had EEG in 2019 and was normal   surg hx:   lower back sx age 11   gyn hx: denies  ob hx:   denies    current pregnancy complication:   GDMA 2   Humolog 18 units breakfast  Humolog 18 units lunch  Humolog  18 units dinner  baby ASA qd   PNV qd       NST in progress  PEC labs pending  BP monitoring  will continue to monitor        T(C): 36.8 (19 @ 15:45), Max: 36.8 (19 @ 15:45)  HR: 83 (19 @ 20:01) (76 - 108)  BP: 126/91 (19 @ 20:01) (112/65 - 172/88)  RR: 18 (19 @ 15:45) (18 - 18)  SpO2: --    PEC labs:   CBC Full  -  ( 2019 16:53 )  WBC Count : 6.19 K/uL  RBC Count : 4.16 M/uL  Hemoglobin : 12.6 g/dL  Hematocrit : 39.7 %  Platelet Count - Automated : 174 K/uL  Mean Cell Volume : 95.4 fL  Mean Cell Hemoglobin : 30.3 pg  Mean Cell Hemoglobin Concentration : 31.7 %  Auto Neutrophil # : 3.95 K/uL  Auto Lymphocyte # : 1.13 K/uL  Auto Monocyte # : 0.87 K/uL  Auto Eosinophil # : 0.16 K/uL  Auto Basophil # : 0.05 K/uL  Auto Neutrophil % : 63.7 %  Auto Lymphocyte % : 18.3 %  Auto Monocyte % : 14.1 %  Auto Eosinophil % : 2.6 %  Auto Basophil % : 0.8 %    PT/INR - ( 2019 16:53 )   PT: 10.6 SEC;   INR: 0.93          PTT - ( 2019 16:53 )  PTT:27.5 SEC    Urinalysis Basic - ( 2019 16:45 )    Color: YELLOW / Appearance: CLEAR / S.025 / pH: 6.0  Gluc: NEGATIVE / Ketone: NEGATIVE  / Bili: NEGATIVE / Urobili: NORMAL   Blood: TRACE / Protein: 600 / Nitrite: NEGATIVE   Leuk Esterase: NEGATIVE / RBC: 0-2 / WBC 6-10   Sq Epi: MODERATE / Non Sq Epi: x / Bacteria: FEW    creatinine, random urine: 198.80  protein, random urine: 372.5  PCR: 1.87    urinalysis:   blood- trace  protein: 600    CMP:  138/4.1, 104/22, 11/0.83<83  AST: 24  ALT: 21    uric acid: 7.2     LDH: 219        cat 1 FHT  toco: irreg     TAS: BPP: 8/8 vtx posterior placenta EFW: 2809 grams MVP: 3.06    FS: 74  addendum:   plan of care d/w dr bar  d/w dr santana  admit to l&D  sPEC @ 34.6 wks gest / betamethasone/ BP monitoring / anesthesia consult / possible  / magnesium sulfate  see admission orders         :   huddle  dr bar/ dr santana/ Ellie francois / dr montes

## 2019-04-22 NOTE — CHART NOTE - NSCHARTNOTEFT_GEN_A_CORE
service attending    pt consented for  section as plan requested by pt and -- risks dw them include but not limited to infection, bleeding, injury  to other organs.    upon further review of the prenatal chart it was found that there was no discussion of mode of delivery by MFM with patient in regards to CP diagnosis.  Patient and  state spinal surgeon stated patient should have  delivery--  due to the fact that MFM has not counseled patient on risks and benefits of NSD v. CS in pt with CP diagnosis MFM fellow and attending called and CS held at this time= this plan was agreed upon  by Dr. Groves as BPs stable, patient on  magnesium, cat 1 fht prior, and no urgency for delivery as per MFM.    At  this time MFM reviewing prenatal records.    Gera SCHWAB

## 2019-04-23 ENCOUNTER — RESULT REVIEW (OUTPATIENT)
Age: 34
End: 2019-04-23

## 2019-04-23 LAB
ALBUMIN SERPL ELPH-MCNC: 3.5 G/DL — SIGNIFICANT CHANGE UP (ref 3.3–5)
ALP SERPL-CCNC: 351 U/L — HIGH (ref 40–120)
ALT FLD-CCNC: 22 U/L — SIGNIFICANT CHANGE UP (ref 4–33)
ANION GAP SERPL CALC-SCNC: 14 MMO/L — SIGNIFICANT CHANGE UP (ref 7–14)
APTT BLD: 26.6 SEC — LOW (ref 27.5–36.3)
APTT BLD: 27 SEC — LOW (ref 27.5–36.3)
AST SERPL-CCNC: 22 U/L — SIGNIFICANT CHANGE UP (ref 4–32)
BASOPHILS # BLD AUTO: 0.02 K/UL — SIGNIFICANT CHANGE UP (ref 0–0.2)
BASOPHILS # BLD AUTO: 0.02 K/UL — SIGNIFICANT CHANGE UP (ref 0–0.2)
BASOPHILS NFR BLD AUTO: 0.1 % — SIGNIFICANT CHANGE UP (ref 0–2)
BASOPHILS NFR BLD AUTO: 0.3 % — SIGNIFICANT CHANGE UP (ref 0–2)
BILIRUB SERPL-MCNC: 0.5 MG/DL — SIGNIFICANT CHANGE UP (ref 0.2–1.2)
BUN SERPL-MCNC: 10 MG/DL — SIGNIFICANT CHANGE UP (ref 7–23)
CALCIUM SERPL-MCNC: 8.4 MG/DL — SIGNIFICANT CHANGE UP (ref 8.4–10.5)
CHLORIDE SERPL-SCNC: 101 MMOL/L — SIGNIFICANT CHANGE UP (ref 98–107)
CO2 SERPL-SCNC: 18 MMOL/L — LOW (ref 22–31)
CREAT SERPL-MCNC: 0.75 MG/DL — SIGNIFICANT CHANGE UP (ref 0.5–1.3)
EOSINOPHIL # BLD AUTO: 0 K/UL — SIGNIFICANT CHANGE UP (ref 0–0.5)
EOSINOPHIL # BLD AUTO: 0.01 K/UL — SIGNIFICANT CHANGE UP (ref 0–0.5)
EOSINOPHIL NFR BLD AUTO: 0 % — SIGNIFICANT CHANGE UP (ref 0–6)
EOSINOPHIL NFR BLD AUTO: 0.1 % — SIGNIFICANT CHANGE UP (ref 0–6)
FIBRINOGEN PPP-MCNC: 613 MG/DL — HIGH (ref 350–510)
GLUCOSE BLDC GLUCOMTR-MCNC: 120 MG/DL — HIGH (ref 70–99)
GLUCOSE BLDC GLUCOMTR-MCNC: 131 MG/DL — HIGH (ref 70–99)
GLUCOSE SERPL-MCNC: 133 MG/DL — HIGH (ref 70–99)
HBA1C BLD-MCNC: 5.8 % — HIGH (ref 4–5.6)
HCT VFR BLD CALC: 38.4 % — SIGNIFICANT CHANGE UP (ref 34.5–45)
HCT VFR BLD CALC: 40.2 % — SIGNIFICANT CHANGE UP (ref 34.5–45)
HGB BLD-MCNC: 12.5 G/DL — SIGNIFICANT CHANGE UP (ref 11.5–15.5)
HGB BLD-MCNC: 13 G/DL — SIGNIFICANT CHANGE UP (ref 11.5–15.5)
IMM GRANULOCYTES NFR BLD AUTO: 0.6 % — SIGNIFICANT CHANGE UP (ref 0–1.5)
IMM GRANULOCYTES NFR BLD AUTO: 0.9 % — SIGNIFICANT CHANGE UP (ref 0–1.5)
INR BLD: 0.88 — SIGNIFICANT CHANGE UP (ref 0.88–1.17)
LDH SERPL L TO P-CCNC: 232 U/L — HIGH (ref 135–225)
LYMPHOCYTES # BLD AUTO: 0.6 K/UL — LOW (ref 1–3.3)
LYMPHOCYTES # BLD AUTO: 1.01 K/UL — SIGNIFICANT CHANGE UP (ref 1–3.3)
LYMPHOCYTES # BLD AUTO: 4.8 % — LOW (ref 13–44)
LYMPHOCYTES # BLD AUTO: 7.7 % — LOW (ref 13–44)
MAGNESIUM SERPL-MCNC: 5.9 MG/DL — HIGH (ref 1.6–2.6)
MAGNESIUM SERPL-MCNC: 6.2 MG/DL — HIGH (ref 1.6–2.6)
MAGNESIUM SERPL-MCNC: 6.7 MG/DL — HIGH (ref 1.6–2.6)
MCHC RBC-ENTMCNC: 30.2 PG — SIGNIFICANT CHANGE UP (ref 27–34)
MCHC RBC-ENTMCNC: 30.8 PG — SIGNIFICANT CHANGE UP (ref 27–34)
MCHC RBC-ENTMCNC: 32.3 % — SIGNIFICANT CHANGE UP (ref 32–36)
MCHC RBC-ENTMCNC: 32.6 % — SIGNIFICANT CHANGE UP (ref 32–36)
MCV RBC AUTO: 93.3 FL — SIGNIFICANT CHANGE UP (ref 80–100)
MCV RBC AUTO: 94.6 FL — SIGNIFICANT CHANGE UP (ref 80–100)
MONOCYTES # BLD AUTO: 0.17 K/UL — SIGNIFICANT CHANGE UP (ref 0–0.9)
MONOCYTES # BLD AUTO: 1.72 K/UL — HIGH (ref 0–0.9)
MONOCYTES NFR BLD AUTO: 2.2 % — SIGNIFICANT CHANGE UP (ref 2–14)
MONOCYTES NFR BLD AUTO: 8.2 % — SIGNIFICANT CHANGE UP (ref 2–14)
NEUTROPHILS # BLD AUTO: 18.03 K/UL — HIGH (ref 1.8–7.4)
NEUTROPHILS # BLD AUTO: 6.96 K/UL — SIGNIFICANT CHANGE UP (ref 1.8–7.4)
NEUTROPHILS NFR BLD AUTO: 86 % — HIGH (ref 43–77)
NEUTROPHILS NFR BLD AUTO: 89.1 % — HIGH (ref 43–77)
NRBC # FLD: 0 K/UL — SIGNIFICANT CHANGE UP (ref 0–0)
NRBC # FLD: 0 K/UL — SIGNIFICANT CHANGE UP (ref 0–0)
PLATELET # BLD AUTO: 172 K/UL — SIGNIFICANT CHANGE UP (ref 150–400)
PLATELET # BLD AUTO: 179 K/UL — SIGNIFICANT CHANGE UP (ref 150–400)
PMV BLD: 11.8 FL — SIGNIFICANT CHANGE UP (ref 7–13)
PMV BLD: 12.5 FL — SIGNIFICANT CHANGE UP (ref 7–13)
POTASSIUM SERPL-MCNC: 4.2 MMOL/L — SIGNIFICANT CHANGE UP (ref 3.5–5.3)
POTASSIUM SERPL-SCNC: 4.2 MMOL/L — SIGNIFICANT CHANGE UP (ref 3.5–5.3)
PROT SERPL-MCNC: 6.5 G/DL — SIGNIFICANT CHANGE UP (ref 6–8.3)
PROTHROM AB SERPL-ACNC: 10 SEC — SIGNIFICANT CHANGE UP (ref 9.8–13.1)
RBC # BLD: 4.06 M/UL — SIGNIFICANT CHANGE UP (ref 3.8–5.2)
RBC # BLD: 4.31 M/UL — SIGNIFICANT CHANGE UP (ref 3.8–5.2)
RBC # FLD: 13.5 % — SIGNIFICANT CHANGE UP (ref 10.3–14.5)
RBC # FLD: 13.5 % — SIGNIFICANT CHANGE UP (ref 10.3–14.5)
SODIUM SERPL-SCNC: 133 MMOL/L — LOW (ref 135–145)
T PALLIDUM AB TITR SER: NEGATIVE — SIGNIFICANT CHANGE UP
URATE SERPL-MCNC: 7.2 MG/DL — HIGH (ref 2.5–7)
WBC # BLD: 20.97 K/UL — HIGH (ref 3.8–10.5)
WBC # BLD: 7.81 K/UL — SIGNIFICANT CHANGE UP (ref 3.8–10.5)
WBC # FLD AUTO: 20.97 K/UL — HIGH (ref 3.8–10.5)
WBC # FLD AUTO: 7.81 K/UL — SIGNIFICANT CHANGE UP (ref 3.8–10.5)

## 2019-04-23 PROCEDURE — 59514 CESAREAN DELIVERY ONLY: CPT | Mod: U9,UB,GC

## 2019-04-23 PROCEDURE — 88307 TISSUE EXAM BY PATHOLOGIST: CPT | Mod: 26

## 2019-04-23 RX ORDER — SIMETHICONE 80 MG/1
80 TABLET, CHEWABLE ORAL EVERY 4 HOURS
Qty: 0 | Refills: 0 | Status: DISCONTINUED | OUTPATIENT
Start: 2019-04-24 | End: 2019-04-26

## 2019-04-23 RX ORDER — SODIUM CHLORIDE 9 MG/ML
1000 INJECTION, SOLUTION INTRAVENOUS
Qty: 0 | Refills: 0 | Status: DISCONTINUED | OUTPATIENT
Start: 2019-04-23 | End: 2019-04-23

## 2019-04-23 RX ORDER — HYDROMORPHONE HYDROCHLORIDE 2 MG/ML
30 INJECTION INTRAMUSCULAR; INTRAVENOUS; SUBCUTANEOUS
Qty: 0 | Refills: 0 | Status: DISCONTINUED | OUTPATIENT
Start: 2019-04-23 | End: 2019-04-24

## 2019-04-23 RX ORDER — OXYCODONE HYDROCHLORIDE 5 MG/1
5 TABLET ORAL
Qty: 0 | Refills: 0 | Status: COMPLETED | OUTPATIENT
Start: 2019-04-24 | End: 2019-05-01

## 2019-04-23 RX ORDER — FERROUS SULFATE 325(65) MG
325 TABLET ORAL DAILY
Qty: 0 | Refills: 0 | Status: DISCONTINUED | OUTPATIENT
Start: 2019-04-24 | End: 2019-04-26

## 2019-04-23 RX ORDER — LABETALOL HCL 100 MG
200 TABLET ORAL EVERY 8 HOURS
Qty: 0 | Refills: 0 | Status: DISCONTINUED | OUTPATIENT
Start: 2019-04-23 | End: 2019-04-23

## 2019-04-23 RX ORDER — OXYTOCIN 10 UNIT/ML
333.33 VIAL (ML) INJECTION
Qty: 20 | Refills: 0 | Status: DISCONTINUED | OUTPATIENT
Start: 2019-04-23 | End: 2019-04-24

## 2019-04-23 RX ORDER — MAGNESIUM SULFATE 500 MG/ML
2 VIAL (ML) INJECTION
Qty: 40 | Refills: 0 | Status: DISCONTINUED | OUTPATIENT
Start: 2019-04-23 | End: 2019-04-23

## 2019-04-23 RX ORDER — IBUPROFEN 200 MG
600 TABLET ORAL EVERY 6 HOURS
Qty: 0 | Refills: 0 | Status: COMPLETED | OUTPATIENT
Start: 2019-04-24 | End: 2020-03-22

## 2019-04-23 RX ORDER — ACETAMINOPHEN 500 MG
975 TABLET ORAL EVERY 6 HOURS
Qty: 0 | Refills: 0 | Status: DISCONTINUED | OUTPATIENT
Start: 2019-04-24 | End: 2019-04-26

## 2019-04-23 RX ORDER — FAMOTIDINE 10 MG/ML
20 INJECTION INTRAVENOUS ONCE
Qty: 0 | Refills: 0 | Status: COMPLETED | OUTPATIENT
Start: 2019-04-23 | End: 2019-04-23

## 2019-04-23 RX ORDER — OXYCODONE HYDROCHLORIDE 5 MG/1
5 TABLET ORAL EVERY 4 HOURS
Qty: 0 | Refills: 0 | Status: COMPLETED | OUTPATIENT
Start: 2019-04-24 | End: 2019-05-01

## 2019-04-23 RX ORDER — DIPHENHYDRAMINE HCL 50 MG
25 CAPSULE ORAL EVERY 6 HOURS
Qty: 0 | Refills: 0 | Status: DISCONTINUED | OUTPATIENT
Start: 2019-04-24 | End: 2019-04-26

## 2019-04-23 RX ORDER — OXYTOCIN 10 UNIT/ML
16.67 VIAL (ML) INJECTION
Qty: 20 | Refills: 0 | Status: DISCONTINUED | OUTPATIENT
Start: 2019-04-23 | End: 2019-04-24

## 2019-04-23 RX ORDER — TETANUS TOXOID, REDUCED DIPHTHERIA TOXOID AND ACELLULAR PERTUSSIS VACCINE, ADSORBED 5; 2.5; 8; 8; 2.5 [IU]/.5ML; [IU]/.5ML; UG/.5ML; UG/.5ML; UG/.5ML
0.5 SUSPENSION INTRAMUSCULAR ONCE
Qty: 0 | Refills: 0 | Status: COMPLETED | OUTPATIENT
Start: 2019-04-24

## 2019-04-23 RX ORDER — DOCUSATE SODIUM 100 MG
100 CAPSULE ORAL
Qty: 0 | Refills: 0 | Status: DISCONTINUED | OUTPATIENT
Start: 2019-04-24 | End: 2019-04-26

## 2019-04-23 RX ORDER — METOCLOPRAMIDE HCL 10 MG
10 TABLET ORAL ONCE
Qty: 0 | Refills: 0 | Status: COMPLETED | OUTPATIENT
Start: 2019-04-23 | End: 2019-04-23

## 2019-04-23 RX ORDER — CITRIC ACID/SODIUM CITRATE 300-500 MG
15 SOLUTION, ORAL ORAL EVERY 4 HOURS
Qty: 0 | Refills: 0 | Status: DISCONTINUED | OUTPATIENT
Start: 2019-04-23 | End: 2019-04-24

## 2019-04-23 RX ORDER — SODIUM CHLORIDE 9 MG/ML
1000 INJECTION, SOLUTION INTRAVENOUS
Qty: 0 | Refills: 0 | Status: DISCONTINUED | OUTPATIENT
Start: 2019-04-23 | End: 2019-04-24

## 2019-04-23 RX ORDER — LANOLIN
1 OINTMENT (GRAM) TOPICAL
Qty: 0 | Refills: 0 | Status: DISCONTINUED | OUTPATIENT
Start: 2019-04-24 | End: 2019-04-26

## 2019-04-23 RX ORDER — CITRIC ACID/SODIUM CITRATE 300-500 MG
30 SOLUTION, ORAL ORAL ONCE
Qty: 0 | Refills: 0 | Status: COMPLETED | OUTPATIENT
Start: 2019-04-23 | End: 2019-04-23

## 2019-04-23 RX ORDER — SODIUM CHLORIDE 9 MG/ML
1000 INJECTION, SOLUTION INTRAVENOUS ONCE
Qty: 0 | Refills: 0 | Status: DISCONTINUED | OUTPATIENT
Start: 2019-04-23 | End: 2019-04-24

## 2019-04-23 RX ORDER — SODIUM CHLORIDE 9 MG/ML
500 INJECTION, SOLUTION INTRAVENOUS ONCE
Qty: 0 | Refills: 0 | Status: DISCONTINUED | OUTPATIENT
Start: 2019-04-23 | End: 2019-04-26

## 2019-04-23 RX ORDER — MAGNESIUM SULFATE 500 MG/ML
1 VIAL (ML) INJECTION
Qty: 40 | Refills: 0 | Status: DISCONTINUED | OUTPATIENT
Start: 2019-04-23 | End: 2019-04-24

## 2019-04-23 RX ORDER — GLYCERIN ADULT
1 SUPPOSITORY, RECTAL RECTAL AT BEDTIME
Qty: 0 | Refills: 0 | Status: DISCONTINUED | OUTPATIENT
Start: 2019-04-24 | End: 2019-04-26

## 2019-04-23 RX ORDER — HYDROMORPHONE HYDROCHLORIDE 2 MG/ML
1 INJECTION INTRAMUSCULAR; INTRAVENOUS; SUBCUTANEOUS
Qty: 0 | Refills: 0 | Status: DISCONTINUED | OUTPATIENT
Start: 2019-04-23 | End: 2019-04-24

## 2019-04-23 RX ORDER — HEPARIN SODIUM 5000 [USP'U]/ML
5000 INJECTION INTRAVENOUS; SUBCUTANEOUS EVERY 12 HOURS
Qty: 0 | Refills: 0 | Status: DISCONTINUED | OUTPATIENT
Start: 2019-04-23 | End: 2019-04-26

## 2019-04-23 RX ADMIN — HYDROMORPHONE HYDROCHLORIDE 30 MILLILITER(S): 2 INJECTION INTRAMUSCULAR; INTRAVENOUS; SUBCUTANEOUS at 19:15

## 2019-04-23 RX ADMIN — Medication 50 GM/HR: at 07:12

## 2019-04-23 RX ADMIN — HYDROMORPHONE HYDROCHLORIDE 30 MILLILITER(S): 2 INJECTION INTRAMUSCULAR; INTRAVENOUS; SUBCUTANEOUS at 10:20

## 2019-04-23 RX ADMIN — Medication 30 MILLILITER(S): at 07:27

## 2019-04-23 RX ADMIN — FAMOTIDINE 20 MILLIGRAM(S): 10 INJECTION INTRAVENOUS at 07:27

## 2019-04-23 RX ADMIN — Medication 10 MILLIGRAM(S): at 07:27

## 2019-04-23 RX ADMIN — SODIUM CHLORIDE 75 MILLILITER(S): 9 INJECTION, SOLUTION INTRAVENOUS at 18:29

## 2019-04-23 RX ADMIN — Medication 25 GM/HR: at 19:17

## 2019-04-23 RX ADMIN — SODIUM CHLORIDE 100 MILLILITER(S): 9 INJECTION, SOLUTION INTRAVENOUS at 16:06

## 2019-04-23 RX ADMIN — Medication 50 MILLIUNIT(S)/MIN: at 11:23

## 2019-04-23 RX ADMIN — HEPARIN SODIUM 5000 UNIT(S): 5000 INJECTION INTRAVENOUS; SUBCUTANEOUS at 17:20

## 2019-04-23 RX ADMIN — Medication 25 GM/HR: at 18:30

## 2019-04-23 RX ADMIN — Medication 200 MILLIGRAM(S): at 05:54

## 2019-04-23 RX ADMIN — Medication 50 GM/HR: at 10:16

## 2019-04-23 NOTE — CHART NOTE - NSCHARTNOTEFT_GEN_A_CORE
R3 OB Note (Back note due to clinical activity)    Pt examined at bedside. Denies headache, vision changes, abdominal pain. Denies ctx, lof, vb, FM+. Denies CP, SOB.    Vital Signs Last 24 Hours  T(C): 36.5 (19 @ 01:36), Max: 36.8 (19 @ 15:45)  HR: 101 (19 @ 03:19) (76 - 114)  BP: 121/57 (19 @ 03:19) (112/65 - 192/97)  RR: 16 (19 @ 21:09) (16 - 18)  SpO2: --    CAPILLARY BLOOD GLUCOSE    POCT Blood Glucose.: 131 mg/dL (2019 00:15)    Gen:AAOX4, NAD  Abd: soft, gravid, nt  Ext: NTBL  SVE: deferred. Pt has good mobility of LE bilaterally, good passive and acitve range of motion of hips    A/P: 34  at 35w a/w elevated BPs, meeting criteria for siPEC. Pt has significant h/o CP and h/o spinal surgery with contraindications to regional anesthesia and vaginal delivery (as per patient and ).   -Mode of delivery discussed with patient and  at length. They understand while CP is not a contraindication to vaginal delivery, they were told years prior that given her spinal surgery, vaginal delivery and "needle in her back" would put her at risk for paralysis. They desire to proceed with  delivery under general anesthesia.  -Pt was examined and counseled by anesthesia attending overnight, Dr. Fernandez, who recommended GETA for  section.   -Given that HELLP labs are wnl, BPs are well controlled status post IVP of lab 20, and lab 200 TID PO, pt is asymptomatic, decision made to proceed with  in AM. Pt and  understand the additional risk of GETA and her history of CP (possible difficulty with intubation), and agree to wait until AM for . MFM to further  pt in AM.  -Will keep pt on continuous monitoring, remain NPO, c/w Mg for seizure ppx, and lab 200 TID, and close BP monitoring  -will repeat HELLP labs in AM  -Given h/o GDMA2, will obtain FS q6h, fluids with Y tubing  -s/p NICU consult  -plan for C/S in AM. RN staff, anesthesia team elijah Montalvo, PGY-3  pt seen w/ Dr. Rose, , d/w DR. Boyle and Ostroff  d/w Dr. Groves and Dr. Altman ([employer])

## 2019-04-23 NOTE — CHART NOTE - NSCHARTNOTEFT_GEN_A_CORE
Called to consult on Ms. Black, a 35yo  mother at 35+0 weeks gestation admitted for sPEC. Maternal history significant for CP and being premature herself (ex23 weeker). Mother nervous about prospect of having premature baby, having been premature herself and living with CP throughout her life. Discussed the following details with mother and father of the baby:    1. The NICU team will be present at her delivery and will immediately assess and care for her infant.  2. Overall survival at 35 weeks 99%.  3. The infant will possibly require respiratory support, most commonly in the form of nasal CPAP. There is a possibility that the infant will require intubation and mechanical ventilation.  4. The infant will be at risk for jaundice which can be treated with phototherapy.  5. Depending on the clinical status of the infant, enteral feedings may not be started immediately. He will receive IVF/IV nutrition as necessary. He is also at risk for hypoglycemia. Due to immature suck/swallow, he may require an orogastric tube once feeds are initiated.  6.  The infant will be screened for infection and treated with antibiotics if deemed clinically necessary.  7. The infant is at risk for thermoregulation issues.     Average length of stay is about 2 weeks.  Ms. Black  had the opportunity to ask questions and may contact the NICU at any time if further question arise.    Upon reviewing OB documentation, BPs appear to be stable. Due to high risk nature of this delivery, decision was made to delay C/S until AM after consultation with MFM. If fetal distress occurs or mother's condition deteriorates, mom will undergo emergent C/S this evening.     Thank you for the opportunity to participate in the care of this patient and please inform us of any changes in her status.

## 2019-04-23 NOTE — OB NEONATOLOGY/PEDIATRICIAN DELIVERY SUMMARY - NSPEDSNEONOTESA_OBGYN_ALL_OB_FT
35wk M born to 33yo , B+, GBS unknown, PNL- and immune by c/s under general anesthesia. Severe PEC, and maternal hx of extreme prematurity (ex 23 weeker) with CP and inability to place epidural due to back surgeries. Mother has been magnesium and labetalol, and also has hx of asthma. Baby was born limp and apneic with no respiratory effort, though HR >100. Started PPV 20/5 30% immediately on arrival to warmer, with very sporadic spontaneous breaths. Continued PPV for about 4 minutes due to intermittent apnea that was gradually improving, and then able to transition to CPAP 5/30%. Pulse ox was not reading well due to peripheral perfusion, briefly read in the 60s initially but cyanosis was rapidly improving. FiO2 was briefly at 80% and came down to 25% by 5 minutes. Brought to NICU for further care. Apgars 3/7/8. Attending Dr. Nunes present at delivery. 35wk M born to 35yo , B+, GBS unknown, PNL- and immune by c/s under general anesthesia. Severe PEC, and maternal hx of extreme prematurity (ex 23 weeker) with CP and inability to place epidural due to back surgeries. Mother has been magnesium and labetalol, and also has hx of asthma. Received beta x1 . Baby was born limp and apneic with no respiratory effort, though HR >100. Started PPV 20/5 30% immediately on arrival to warmer, with very sporadic spontaneous breaths. Continued PPV for about 4 minutes due to intermittent apnea that was gradually improving, and then able to transition to CPAP 5/30%. Pulse ox was not reading well due to peripheral perfusion, briefly read in the 60s initially but cyanosis was rapidly improving. FiO2 was briefly at 80% and came down to 25% by 5 minutes. Brought to NICU for further care. Apgars 3/7/8. Attending Dr. Nunes present at delivery. 35wk M born to 33yo , B+, GBS unknown, PNL- and immune by c/s under general anesthesia. Severe PEC, and maternal hx of extreme prematurity (ex 23 weeker) with CP and inability to place epidural due to back surgeries. Mother has been magnesium and labetalol, and also has hx of asthma, and GDMA2 on insulin. Received beta x1 . Baby was born limp and apneic with no respiratory effort, though HR >100. Started PPV 20/5 30% immediately on arrival to warmer, with very sporadic spontaneous breaths. Continued PPV for about 4 minutes due to intermittent apnea that was gradually improving, and then able to transition to CPAP 5/30%. Pulse ox was not reading well due to peripheral perfusion, briefly read in the 60s initially but cyanosis was rapidly improving. FiO2 was briefly at 80% and came down to 25% by 5 minutes. Brought to NICU for further care. Apgars 3/7/8. Attending Dr. Nunes present at delivery.

## 2019-04-23 NOTE — OB PROVIDER DELIVERY SUMMARY - NSPROVIDERDELIVERYNOTE_OBGYN_ALL_OB_FT
Grossly normal appearing uterus with 2-3 cm leiomyomas, bilateral fallopian tubes, and ovaries.   APGARS 3,7,8  2370g  800/2000/150

## 2019-04-23 NOTE — PROVIDER CONTACT NOTE (MEDICATION) - ASSESSMENT
lungs clear reflexes 1+, fundus firm without massage, bleeding scant, susana urine draining via portillo catheter, pt asleep and stated she is drowsy is alert on arousal

## 2019-04-24 DIAGNOSIS — O24.419 GESTATIONAL DIABETES MELLITUS IN PREGNANCY, UNSPECIFIED CONTROL: ICD-10-CM

## 2019-04-24 DIAGNOSIS — O09.90 SUPERVISION OF HIGH RISK PREGNANCY, UNSPECIFIED, UNSPECIFIED TRIMESTER: ICD-10-CM

## 2019-04-24 LAB
ALBUMIN SERPL ELPH-MCNC: 3.1 G/DL — LOW (ref 3.3–5)
ALP SERPL-CCNC: 280 U/L — HIGH (ref 40–120)
ALT FLD-CCNC: 24 U/L — SIGNIFICANT CHANGE UP (ref 4–33)
ANION GAP SERPL CALC-SCNC: 14 MMO/L — SIGNIFICANT CHANGE UP (ref 7–14)
APTT BLD: 28.5 SEC — SIGNIFICANT CHANGE UP (ref 27.5–36.3)
AST SERPL-CCNC: 41 U/L — HIGH (ref 4–32)
BASOPHILS # BLD AUTO: 0.02 K/UL — SIGNIFICANT CHANGE UP (ref 0–0.2)
BASOPHILS NFR BLD AUTO: 0.1 % — SIGNIFICANT CHANGE UP (ref 0–2)
BILIRUB SERPL-MCNC: 0.5 MG/DL — SIGNIFICANT CHANGE UP (ref 0.2–1.2)
BUN SERPL-MCNC: 11 MG/DL — SIGNIFICANT CHANGE UP (ref 7–23)
CALCIUM SERPL-MCNC: 7.7 MG/DL — LOW (ref 8.4–10.5)
CHLORIDE SERPL-SCNC: 99 MMOL/L — SIGNIFICANT CHANGE UP (ref 98–107)
CO2 SERPL-SCNC: 20 MMOL/L — LOW (ref 22–31)
CREAT SERPL-MCNC: 0.74 MG/DL — SIGNIFICANT CHANGE UP (ref 0.5–1.3)
EOSINOPHIL # BLD AUTO: 0 K/UL — SIGNIFICANT CHANGE UP (ref 0–0.5)
EOSINOPHIL NFR BLD AUTO: 0 % — SIGNIFICANT CHANGE UP (ref 0–6)
FIBRINOGEN PPP-MCNC: 554.2 MG/DL — HIGH (ref 350–510)
GLUCOSE SERPL-MCNC: 160 MG/DL — HIGH (ref 70–99)
HCT VFR BLD CALC: 36.8 % — SIGNIFICANT CHANGE UP (ref 34.5–45)
HGB BLD-MCNC: 11.8 G/DL — SIGNIFICANT CHANGE UP (ref 11.5–15.5)
IMM GRANULOCYTES NFR BLD AUTO: 0.6 % — SIGNIFICANT CHANGE UP (ref 0–1.5)
INR BLD: 0.9 — SIGNIFICANT CHANGE UP (ref 0.88–1.17)
LDH SERPL L TO P-CCNC: 321 U/L — HIGH (ref 135–225)
LYMPHOCYTES # BLD AUTO: 0.79 K/UL — LOW (ref 1–3.3)
LYMPHOCYTES # BLD AUTO: 4.3 % — LOW (ref 13–44)
MAGNESIUM SERPL-MCNC: 5.6 MG/DL — HIGH (ref 1.6–2.6)
MAGNESIUM SERPL-MCNC: 5.7 MG/DL — HIGH (ref 1.6–2.6)
MCHC RBC-ENTMCNC: 30.2 PG — SIGNIFICANT CHANGE UP (ref 27–34)
MCHC RBC-ENTMCNC: 32.1 % — SIGNIFICANT CHANGE UP (ref 32–36)
MCV RBC AUTO: 94.1 FL — SIGNIFICANT CHANGE UP (ref 80–100)
MONOCYTES # BLD AUTO: 1.41 K/UL — HIGH (ref 0–0.9)
MONOCYTES NFR BLD AUTO: 7.6 % — SIGNIFICANT CHANGE UP (ref 2–14)
NEUTROPHILS # BLD AUTO: 16.21 K/UL — HIGH (ref 1.8–7.4)
NEUTROPHILS NFR BLD AUTO: 87.4 % — HIGH (ref 43–77)
NRBC # FLD: 0 K/UL — SIGNIFICANT CHANGE UP (ref 0–0)
PLATELET # BLD AUTO: 184 K/UL — SIGNIFICANT CHANGE UP (ref 150–400)
PMV BLD: 12.1 FL — SIGNIFICANT CHANGE UP (ref 7–13)
POTASSIUM SERPL-MCNC: 4.7 MMOL/L — SIGNIFICANT CHANGE UP (ref 3.5–5.3)
POTASSIUM SERPL-SCNC: 4.7 MMOL/L — SIGNIFICANT CHANGE UP (ref 3.5–5.3)
PROT SERPL-MCNC: 5.9 G/DL — LOW (ref 6–8.3)
PROTHROM AB SERPL-ACNC: 10 SEC — SIGNIFICANT CHANGE UP (ref 9.8–13.1)
RBC # BLD: 3.91 M/UL — SIGNIFICANT CHANGE UP (ref 3.8–5.2)
RBC # FLD: 13.7 % — SIGNIFICANT CHANGE UP (ref 10.3–14.5)
SODIUM SERPL-SCNC: 133 MMOL/L — LOW (ref 135–145)
URATE SERPL-MCNC: 7.2 MG/DL — HIGH (ref 2.5–7)
WBC # BLD: 18.55 K/UL — HIGH (ref 3.8–10.5)
WBC # FLD AUTO: 18.55 K/UL — HIGH (ref 3.8–10.5)

## 2019-04-24 RX ORDER — HYDROMORPHONE HYDROCHLORIDE 2 MG/ML
0.5 INJECTION INTRAMUSCULAR; INTRAVENOUS; SUBCUTANEOUS
Qty: 0 | Refills: 0 | Status: DISCONTINUED | OUTPATIENT
Start: 2019-04-24 | End: 2019-04-24

## 2019-04-24 RX ORDER — OXYCODONE HYDROCHLORIDE 5 MG/1
5 TABLET ORAL EVERY 4 HOURS
Qty: 0 | Refills: 0 | Status: DISCONTINUED | OUTPATIENT
Start: 2019-04-24 | End: 2019-04-26

## 2019-04-24 RX ORDER — IBUPROFEN 200 MG
600 TABLET ORAL EVERY 6 HOURS
Qty: 0 | Refills: 0 | Status: DISCONTINUED | OUTPATIENT
Start: 2019-04-24 | End: 2019-04-26

## 2019-04-24 RX ORDER — ALBUTEROL 90 UG/1
2.5 AEROSOL, METERED ORAL ONCE
Qty: 0 | Refills: 0 | Status: DISCONTINUED | OUTPATIENT
Start: 2019-04-24 | End: 2019-04-26

## 2019-04-24 RX ORDER — OXYCODONE HYDROCHLORIDE 5 MG/1
5 TABLET ORAL
Qty: 0 | Refills: 0 | Status: DISCONTINUED | OUTPATIENT
Start: 2019-04-24 | End: 2019-04-26

## 2019-04-24 RX ORDER — MAGNESIUM HYDROXIDE 400 MG/1
30 TABLET, CHEWABLE ORAL DAILY
Qty: 0 | Refills: 0 | Status: DISCONTINUED | OUTPATIENT
Start: 2019-04-24 | End: 2019-04-26

## 2019-04-24 RX ORDER — NALOXONE HYDROCHLORIDE 4 MG/.1ML
0.1 SPRAY NASAL
Qty: 0 | Refills: 0 | Status: DISCONTINUED | OUTPATIENT
Start: 2019-04-24 | End: 2019-04-24

## 2019-04-24 RX ADMIN — SIMETHICONE 80 MILLIGRAM(S): 80 TABLET, CHEWABLE ORAL at 18:23

## 2019-04-24 RX ADMIN — Medication 600 MILLIGRAM(S): at 16:53

## 2019-04-24 RX ADMIN — Medication 600 MILLIGRAM(S): at 17:30

## 2019-04-24 RX ADMIN — Medication 975 MILLIGRAM(S): at 16:53

## 2019-04-24 RX ADMIN — SIMETHICONE 80 MILLIGRAM(S): 80 TABLET, CHEWABLE ORAL at 15:20

## 2019-04-24 RX ADMIN — HYDROMORPHONE HYDROCHLORIDE 30 MILLILITER(S): 2 INJECTION INTRAMUSCULAR; INTRAVENOUS; SUBCUTANEOUS at 07:22

## 2019-04-24 RX ADMIN — Medication 25 GM/HR: at 07:21

## 2019-04-24 RX ADMIN — HEPARIN SODIUM 5000 UNIT(S): 5000 INJECTION INTRAVENOUS; SUBCUTANEOUS at 18:20

## 2019-04-24 RX ADMIN — HEPARIN SODIUM 5000 UNIT(S): 5000 INJECTION INTRAVENOUS; SUBCUTANEOUS at 05:53

## 2019-04-24 RX ADMIN — Medication 100 MILLIGRAM(S): at 14:16

## 2019-04-24 RX ADMIN — Medication 975 MILLIGRAM(S): at 17:30

## 2019-04-24 RX ADMIN — MAGNESIUM HYDROXIDE 30 MILLILITER(S): 400 TABLET, CHEWABLE ORAL at 16:48

## 2019-04-24 NOTE — PROGRESS NOTE ADULT - PROBLEM SELECTOR PLAN 1
- Magnesium until 830a  - d/c portillo with mg  - consider procardia for maintenance if BPs poorly controlled (PMH asthma)  - baby boy; requesting circ  - breastfeeding  - follow up birth control plan  - Continue regular diet.  - Increase ambulation.  - Continue motrin, tylenol, oxycodone PRN for pain control.    - F/u AM CBC    Shalonda Palomino PGY1

## 2019-04-24 NOTE — PROGRESS NOTE ADULT - SUBJECTIVE AND OBJECTIVE BOX
OB Progress Note:  Delivery, POD#1    S: 33yo POD#1 s/p LTCS . Her pain is well controlled. She is tolerating a regular diet. Not yet passing flatus. Denies N/V. Denies CP/SOB/lightheadedness/dizziness.   Denies blurry vision and headaches.  She is ambulating without difficulty.   Baptiste in place.    O:   Vital Signs Last 24 Hrs  T(C): 36.3 (2019 09:30), Max: 36.3 (2019 09:30)  T(F): 97.3 (2019 09:30), Max: 97.3 (2019 09:30)  HR: 96 (2019 07:00) (68 - 103)  BP: 121/72 (2019 07:00) (97/61 - 135/82)  BP(mean): 86 (2019 07:00) (68 - 100)  RR: 19 (2019 07:00) (11 - 22)  SpO2: 96% (2019 07:00) (95% - 100%)    Labs:  Blood type: B Positive  Rubella IgG: Positive ( @ 13:23)  RPR: Negative                          11.8   18.55<H> >-----------< 184    (  @ 06:10 )             36.8                        12.5   20.97<H> >-----------< 179    (  @ 17:30 )             38.4                        13.0   7.81 >-----------< 172    (  @ 04:05 )             40.2                        12.6   6.19 >-----------< 174    (  @ 16:53 )             39.7    19 @ 06:10      133<L>  |  99  |  11  ----------------------------<  160<H>  4.7   |  20<L>  |  0.74    19 @ 04:05      133<L>  |  101  |  10  ----------------------------<  133<H>  4.2   |  18<L>  |  0.75    19 @ 16:53      138  |  104  |  11  ----------------------------<  83  4.1   |  22  |  0.83        Ca    7.7<L>      2019 06:10  Ca    8.4      2019 04:05  Ca    9.9      2019 16:53  Mg     5.7<H>     04-24  Mg     5.6<H>     04-23  Mg     5.9<H>     04-23  Mg     6.7<H>     04-23  Mg     6.2<H>     04-23    TPro  5.9<L>  /  Alb  3.1<L>  /  TBili  0.5  /  DBili  x   /  AST  41<H>  /  ALT  24  /  AlkPhos  280<H>  19 @ 06:10  TPro  6.5  /  Alb  3.5  /  TBili  0.5  /  DBili  x   /  AST  22  /  ALT  22  /  AlkPhos  351<H>  19 @ 04:05  TPro  6.3  /  Alb  3.3  /  TBili  0.5  /  DBili  x   /  AST  24  /  ALT  21  /  AlkPhos  325<H>  19 @ 16:53          PE:  General: NAD  Abdomen: Mildly distended, appropriately tender, incision c/d/i.  Extremities: No erythema, no pitting edema

## 2019-04-24 NOTE — PROGRESS NOTE ADULT - SUBJECTIVE AND OBJECTIVE BOX
INTERVAL HPI/OVERNIGHT EVENTS:  34y Female s/p c section under spinal anesthesia with duramorph for post op analgesia on 4/23/19    Vital Signs Last 24 Hrs  T(C): 36.3 (23 Apr 2019 09:30), Max: 36.3 (23 Apr 2019 09:30)  T(F): 97.3 (23 Apr 2019 09:30), Max: 97.3 (23 Apr 2019 09:30)  HR: 96 (24 Apr 2019 07:00) (68 - 103)  BP: 121/72 (24 Apr 2019 07:00) (97/61 - 135/82)  BP(mean): 86 (24 Apr 2019 07:00) (68 - 100)  RR: 19 (24 Apr 2019 07:00) (11 - 22)  SpO2: 96% (24 Apr 2019 07:00) (95% - 100%)    Patient seen 07:42 and doing well, no anesthetic complications or complaints noted or reported.  Pain is controlled.

## 2019-04-24 NOTE — PROGRESS NOTE ADULT - ASSESSMENT
A/P: 35yo PMH CP POD#1 s/p LTCS c/b sPEC on Mg. BPs well controlled and no concern for severe features at this time.

## 2019-04-24 NOTE — PROGRESS NOTE ADULT - SUBJECTIVE AND OBJECTIVE BOX
INTERVAL HPI/OVERNIGHT EVENTS:  34y Female s/p day 1 c section under general anesthesia with duramorph for post op analgesia  Vital Signs Last 24 Hrs  T(C): 36.3 (23 Apr 2019 09:30), Max: 36.3 (23 Apr 2019 09:30)  T(F): 97.3 (23 Apr 2019 09:30), Max: 97.3 (23 Apr 2019 09:30)  HR: 96 (24 Apr 2019 07:00) (68 - 103)  BP: 121/72 (24 Apr 2019 07:00) (97/61 - 135/82)  BP(mean): 86 (24 Apr 2019 07:00) (68 - 100)  RR: 19 (24 Apr 2019 07:00) (11 - 22)  SpO2: 96% (24 Apr 2019 07:00) (95% - 100%)    Patient seen 8 am in OB PACU. On magnesium and doing well, no anesthetic complications or complaints noted or reported.  Pain is controlled.  Chats very pleasantly.

## 2019-04-25 ENCOUNTER — TRANSCRIPTION ENCOUNTER (OUTPATIENT)
Age: 34
End: 2019-04-25

## 2019-04-25 RX ORDER — BENZOCAINE AND MENTHOL 5; 1 G/100ML; G/100ML
1 LIQUID ORAL
Qty: 0 | Refills: 0 | Status: DISCONTINUED | OUTPATIENT
Start: 2019-04-25 | End: 2019-04-25

## 2019-04-25 RX ORDER — ONDANSETRON 8 MG/1
4 TABLET, FILM COATED ORAL ONCE
Qty: 0 | Refills: 0 | Status: COMPLETED | OUTPATIENT
Start: 2019-04-25 | End: 2019-04-25

## 2019-04-25 RX ORDER — BENZOCAINE AND MENTHOL 5; 1 G/100ML; G/100ML
1 LIQUID ORAL
Qty: 0 | Refills: 0 | Status: DISCONTINUED | OUTPATIENT
Start: 2019-04-25 | End: 2019-04-26

## 2019-04-25 RX ORDER — TETANUS TOXOID, REDUCED DIPHTHERIA TOXOID AND ACELLULAR PERTUSSIS VACCINE, ADSORBED 5; 2.5; 8; 8; 2.5 [IU]/.5ML; [IU]/.5ML; UG/.5ML; UG/.5ML; UG/.5ML
0.5 SUSPENSION INTRAMUSCULAR ONCE
Qty: 0 | Refills: 0 | Status: COMPLETED | OUTPATIENT
Start: 2019-04-25 | End: 2019-04-25

## 2019-04-25 RX ADMIN — ONDANSETRON 4 MILLIGRAM(S): 8 TABLET, FILM COATED ORAL at 09:40

## 2019-04-25 RX ADMIN — Medication 600 MILLIGRAM(S): at 00:11

## 2019-04-25 RX ADMIN — SIMETHICONE 80 MILLIGRAM(S): 80 TABLET, CHEWABLE ORAL at 00:11

## 2019-04-25 RX ADMIN — Medication 975 MILLIGRAM(S): at 00:11

## 2019-04-25 RX ADMIN — MAGNESIUM HYDROXIDE 30 MILLILITER(S): 400 TABLET, CHEWABLE ORAL at 18:05

## 2019-04-25 RX ADMIN — Medication 600 MILLIGRAM(S): at 01:00

## 2019-04-25 RX ADMIN — SIMETHICONE 80 MILLIGRAM(S): 80 TABLET, CHEWABLE ORAL at 10:00

## 2019-04-25 RX ADMIN — SIMETHICONE 80 MILLIGRAM(S): 80 TABLET, CHEWABLE ORAL at 15:17

## 2019-04-25 RX ADMIN — HEPARIN SODIUM 5000 UNIT(S): 5000 INJECTION INTRAVENOUS; SUBCUTANEOUS at 18:05

## 2019-04-25 RX ADMIN — Medication 975 MILLIGRAM(S): at 01:00

## 2019-04-25 RX ADMIN — HEPARIN SODIUM 5000 UNIT(S): 5000 INJECTION INTRAVENOUS; SUBCUTANEOUS at 06:11

## 2019-04-25 RX ADMIN — Medication 100 MILLIGRAM(S): at 15:17

## 2019-04-25 RX ADMIN — SIMETHICONE 80 MILLIGRAM(S): 80 TABLET, CHEWABLE ORAL at 06:14

## 2019-04-25 RX ADMIN — TETANUS TOXOID, REDUCED DIPHTHERIA TOXOID AND ACELLULAR PERTUSSIS VACCINE, ADSORBED 0.5 MILLILITER(S): 5; 2.5; 8; 8; 2.5 SUSPENSION INTRAMUSCULAR at 22:30

## 2019-04-25 NOTE — PROVIDER CONTACT NOTE (OTHER) - ACTION/TREATMENT ORDERED:
resident Shalonda Palomino made aware, zofran to be ordered. will come to assess patient. will continue to monitor.

## 2019-04-25 NOTE — DISCHARGE NOTE OB - CARE PROVIDER_API CALL
[facility],   [address] [place] [address]  Ambulatory Care Unit  Oncology Building  Phone: [phone]  Fax: [phone]   )    -  Follow Up Time:

## 2019-04-25 NOTE — DISCHARGE NOTE OB - HOSPITAL COURSE
Patient had uncomplicated low transverse  section c/b siPEC on Mg.  Please see operative note for details.  During postpartum course patient's vitals were stable, vaginal bleeding appropriate, and pain well controlled.  Post operation day one hematocrit was appropriate.  On day of discharge patient was ambulating, her pain controlled with oral medications, had adequate oral intake, and was voiding freely.  Discharge instructions and precautions were given.  Will return to clinic in 2 weeks for incision check.  Postpartum birth control plan is ____. Patient had uncomplicated low transverse  section c/b siPEC on Mg. Please see operative note for details.  During postpartum course patient's vitals were stable, vaginal bleeding appropriate, and pain well controlled.  Post operation day one hematocrit was appropriate.  On day of discharge patient was ambulating, her pain controlled with oral medications, had adequate oral intake, and was voiding freely.  Discharge instructions and precautions were given.  Will return to clinic in 2 weeks for incision check.  Postpartum birth control plan is condoms.

## 2019-04-25 NOTE — DISCHARGE NOTE OB - PROVIDER TOKENS
FREE:[LAST:[[facility]],PHONE:[phone]],FAX:[phone]   [phone]    -],ADDRESS:[[address] [place] [address]  Ambulatory Care [facility]  Oncology [facility]]]

## 2019-04-25 NOTE — DISCHARGE NOTE OB - MEDICATION SUMMARY - MEDICATIONS TO TAKE
I will START or STAY ON the medications listed below when I get home from the hospital:    acetaminophen 325 mg oral tablet  -- 3 tab(s) by mouth every 6 hours  -- Indication: For Pain    ibuprofen 600 mg oral tablet  -- 1 tab(s) by mouth every 6 hours  -- Indication: For Pain    docusate sodium 100 mg oral capsule  -- 1 cap(s) by mouth 2 times a day, As needed, Stool Softening  -- Indication: For Pain I will START or STAY ON the medications listed below when I get home from the hospital:    Hospital-grade electric breast pump  -- Use as needed according to 's instructions  -- Indication: For breastfeeding    Blood pressure cuff  -- Use once daily as instructed by your doctor  -- Indication: For hypertension    acetaminophen 325 mg oral tablet  -- 3 tab(s) by mouth every 6 hours  -- Indication: For Pain    ibuprofen 600 mg oral tablet  -- 1 tab(s) by mouth every 6 hours  -- Indication: For Pain    docusate sodium 100 mg oral capsule  -- 1 cap(s) by mouth 2 times a day, As needed, Stool Softening  -- Indication: For Pain    Ortho Micronor 0.35 mg oral tablet  -- 1 tab(s) by mouth once a day   -- Do not take this drug if you are pregnant.  It is very important that you take or use this exactly as directed.  Do not skip doses or discontinue unless directed by your doctor.    -- Indication: For Contraception

## 2019-04-25 NOTE — DISCHARGE NOTE OB - ADDITIONAL INSTRUCTIONS
Please call for  follow up  postpartum visit within 2 weeks of delivery date,  at Ambulatory Clinic Unit : [facility]:  [facility], 3rd floor : phone # [phone] or walk-in hours are: MONDAY 3-6 pm, WEDNESDAY 3-6 pm, FRIDAY 9-11 am, 1-3 pm

## 2019-04-25 NOTE — DISCHARGE NOTE OB - PATIENT PORTAL LINK FT
You can access the [facility] Patient Portal, offered by [facility], by registering with the following website: [email or web address]

## 2019-04-25 NOTE — DISCHARGE NOTE OB - CARE PLAN
Principal Discharge DX:	 delivery delivered  Goal:	home with routine postpartum care  Assessment and plan of treatment:	After discharge, please stay on pelvic rest for 6 weeks, meaning no sexual intercourse, no tampons and no douching.  No driving for 2 weeks as women can loose a lot of blood during delivery and there is a possibility of being lightheaded/fainting.  No lifting objects heavier than baby for two weeks.  Expect to have vaginal bleeding/spotting for up to six weeks.  The bleeding should get lighter and more white/light brown with time.  For bleeding soaking more than a pad an hour or passing clots greater than the size of your fist, come in to the emergency department.    Follow up in clinic in 1 week for blood pressure check and in 2 weeks for incision check.  Call clinic for noticeable increase in redness or swelling at incision, discharge from incision, or opening of skin at incision site. Principal Discharge DX:	 delivery delivered  Goal:	home with routine postpartum care  Assessment and plan of treatment:	After discharge, please stay on pelvic rest for 6 weeks, meaning no sexual intercourse, no tampons and no douching.  No driving for 2 weeks as women can loose a lot of blood during delivery and there is a possibility of being lightheaded/fainting.  No lifting objects heavier than baby for two weeks.  Expect to have vaginal bleeding/spotting for up to six weeks.  The bleeding should get lighter and more white/light brown with time.  For bleeding soaking more than a pad an hour or passing clots greater than the size of your fist, come in to the emergency department.    Follow up in clinic in 1 week for blood pressure check and in 2 weeks for incision check.  Call clinic for noticeable increase in redness or swelling at incision, discharge from incision, or opening of skin at incision site. Take your blood pressure at home, once a day, in the morning at rest. Please call your doctor and go to the emergency room if your blood pressure is elevated above either 160 systolic (the top number) or 110 diastolic (the bottom number). Go to the emergency room immediately for: shortness of breath, headache that does not resolve with medications, blurry vision, or seeing spots.

## 2019-04-25 NOTE — CHART NOTE - NSCHARTNOTEFT_GEN_A_CORE
OB Progress Note: LTCS, POD#2    S: 33yo POD#2 s/p LTCS. Called to bedside for patient vomiting x1. Nausea improved and feels better. Emesis was bilious as patient has not eaten anything yet today. She has been drinking a lot of prune juice, most recently overnight. No diarrhea, fevers, chills. Passing flatus throughout the morning. No abdominal pain.  and patient both agree belly is less distended as compared to yesterday.     O:  Vitals:  Vital Signs Last 24 Hrs  T(C): 36.4 (25 Apr 2019 05:23), Max: 37 (24 Apr 2019 22:00)  T(F): 97.6 (25 Apr 2019 05:23), Max: 98.6 (24 Apr 2019 22:00)  HR: 80 (25 Apr 2019 05:23) (80 - 102)  BP: 136/79 (25 Apr 2019 05:23) (126/73 - 146/83)  BP(mean): 98 (24 Apr 2019 12:21) (87 - 102)  RR: 18 (25 Apr 2019 05:23) (16 - 21)  SpO2: 100% (25 Apr 2019 05:23) (97% - 100%)    MEDICATIONS  (STANDING):  acetaminophen   Tablet .. 975 milliGRAM(s) Oral every 6 hours  diphtheria/tetanus/pertussis (acellular) Vaccine (ADAcel) 0.5 milliLiter(s) IntraMuscular once  ferrous    sulfate 325 milliGRAM(s) Oral daily  heparin  Injectable 5000 Unit(s) SubCutaneous every 12 hours  ibuprofen  Tablet. 600 milliGRAM(s) Oral every 6 hours  lactated ringers Bolus 500 milliLiter(s) IV Bolus once  oxyCODONE    IR 5 milliGRAM(s) Oral every 3 hours  prenatal multivitamin 1 Tablet(s) Oral daily      MEDICATIONS  (PRN):  ALBUTerol    0.083%. 2.5 milliGRAM(s) Nebulizer once PRN Shortness of Breath and/or Wheezing  diphenhydrAMINE 25 milliGRAM(s) Oral every 6 hours PRN Itching  docusate sodium 100 milliGRAM(s) Oral two times a day PRN Stool Softening  glycerin Suppository - Adult 1 Suppository(s) Rectal at bedtime PRN Constipation  lanolin Ointment 1 Application(s) Topical every 3 hours PRN Sore Nipples  magnesium hydroxide Suspension 30 milliLiter(s) Oral daily PRN Constipation  oxyCODONE    IR 5 milliGRAM(s) Oral every 4 hours PRN Severe Pain (7 - 10)  simethicone 80 milliGRAM(s) Chew every 4 hours PRN Gas      Labs:  Blood type: B Positive  Rubella IgG: Positive (11-21 @ 13:23)  RPR: Negative                          11.8   18.55<H> >-----------< 184    ( 04-24 @ 06:10 )             36.8                        12.5   20.97<H> >-----------< 179    ( 04-23 @ 17:30 )             38.4                        13.0   7.81 >-----------< 172    ( 04-23 @ 04:05 )             40.2                        12.6   6.19 >-----------< 174    ( 04-22 @ 16:53 )             39.7    04-24-19 @ 06:10      133<L>  |  99  |  11  ----------------------------<  160<H>  4.7   |  20<L>  |  0.74    04-23-19 @ 04:05      133<L>  |  101  |  10  ----------------------------<  133<H>  4.2   |  18<L>  |  0.75    04-22-19 @ 16:53      138  |  104  |  11  ----------------------------<  83  4.1   |  22  |  0.83        Ca    7.7<L>      24 Apr 2019 06:10  Ca    8.4      23 Apr 2019 04:05  Ca    9.9      22 Apr 2019 16:53  Mg     5.7<H>     04-24  Mg     5.6<H>     04-23  Mg     5.9<H>     04-23  Mg     6.7<H>     04-23  Mg     6.2<H>     04-23    TPro  5.9<L>  /  Alb  3.1<L>  /  TBili  0.5  /  DBili  x   /  AST  41<H>  /  ALT  24  /  AlkPhos  280<H>  04-24-19 @ 06:10  TPro  6.5  /  Alb  3.5  /  TBili  0.5  /  DBili  x   /  AST  22  /  ALT  22  /  AlkPhos  351<H>  04-23-19 @ 04:05  TPro  6.3  /  Alb  3.3  /  TBili  0.5  /  DBili  x   /  AST  24  /  ALT  21  /  AlkPhos  325<H>  04-22-19 @ 16:53          PE:  General: NAD  Abdomen: soft, moderately distended. BSx4. Appropriately tender, incision c/d/i.  Extremities: No erythema, no pitting edema    A/P: 33yo POD#2 s/p LTCS. Patient vomiting is unlikely to be due to an ileus; bowel sounds intact and patient passing flatus. Eating was not impetus behind vomiting as patient has not eaten yet today.   - zofran prn  - encouraged patient not to continue prune or any other juice right now in case of gastric irritation    - discussed light and clear diet for right now until nausea resolves  - Increase ambulation.    Shalonda Palomino PGY1 OB Progress Note: LTCS, POD#2    S: 35yo POD#2 s/p LTCS. Called to bedside for patient vomiting x1. Nausea improved and feels better. Emesis was bilious as patient has not eaten anything yet today. She has been drinking a lot of prune juice, most recently overnight. No diarrhea, fevers, chills. Passing flatus throughout the morning. No abdominal pain.  and patient both agree belly is less distended as compared to yesterday.     O:  Vitals:  Vital Signs Last 24 Hrs  T(C): 36.4 (25 Apr 2019 05:23), Max: 37 (24 Apr 2019 22:00)  T(F): 97.6 (25 Apr 2019 05:23), Max: 98.6 (24 Apr 2019 22:00)  HR: 80 (25 Apr 2019 05:23) (80 - 102)  BP: 136/79 (25 Apr 2019 05:23) (126/73 - 146/83)  BP(mean): 98 (24 Apr 2019 12:21) (87 - 102)  RR: 18 (25 Apr 2019 05:23) (16 - 21)  SpO2: 100% (25 Apr 2019 05:23) (97% - 100%)    MEDICATIONS  (STANDING):  acetaminophen   Tablet .. 975 milliGRAM(s) Oral every 6 hours  diphtheria/tetanus/pertussis (acellular) Vaccine (ADAcel) 0.5 milliLiter(s) IntraMuscular once  ferrous    sulfate 325 milliGRAM(s) Oral daily  heparin  Injectable 5000 Unit(s) SubCutaneous every 12 hours  ibuprofen  Tablet. 600 milliGRAM(s) Oral every 6 hours  lactated ringers Bolus 500 milliLiter(s) IV Bolus once  oxyCODONE    IR 5 milliGRAM(s) Oral every 3 hours  prenatal multivitamin 1 Tablet(s) Oral daily      MEDICATIONS  (PRN):  ALBUTerol    0.083%. 2.5 milliGRAM(s) Nebulizer once PRN Shortness of Breath and/or Wheezing  diphenhydrAMINE 25 milliGRAM(s) Oral every 6 hours PRN Itching  docusate sodium 100 milliGRAM(s) Oral two times a day PRN Stool Softening  glycerin Suppository - Adult 1 Suppository(s) Rectal at bedtime PRN Constipation  lanolin Ointment 1 Application(s) Topical every 3 hours PRN Sore Nipples  magnesium hydroxide Suspension 30 milliLiter(s) Oral daily PRN Constipation  oxyCODONE    IR 5 milliGRAM(s) Oral every 4 hours PRN Severe Pain (7 - 10)  simethicone 80 milliGRAM(s) Chew every 4 hours PRN Gas      Labs:  Blood type: B Positive  Rubella IgG: Positive (11-21 @ 13:23)  RPR: Negative                          11.8   18.55<H> >-----------< 184    ( 04-24 @ 06:10 )             36.8                        12.5   20.97<H> >-----------< 179    ( 04-23 @ 17:30 )             38.4                        13.0   7.81 >-----------< 172    ( 04-23 @ 04:05 )             40.2                        12.6   6.19 >-----------< 174    ( 04-22 @ 16:53 )             39.7    04-24-19 @ 06:10      133<L>  |  99  |  11  ----------------------------<  160<H>  4.7   |  20<L>  |  0.74    04-23-19 @ 04:05      133<L>  |  101  |  10  ----------------------------<  133<H>  4.2   |  18<L>  |  0.75    04-22-19 @ 16:53      138  |  104  |  11  ----------------------------<  83  4.1   |  22  |  0.83        Ca    7.7<L>      24 Apr 2019 06:10  Ca    8.4      23 Apr 2019 04:05  Ca    9.9      22 Apr 2019 16:53  Mg     5.7<H>     04-24  Mg     5.6<H>     04-23  Mg     5.9<H>     04-23  Mg     6.7<H>     04-23  Mg     6.2<H>     04-23    TPro  5.9<L>  /  Alb  3.1<L>  /  TBili  0.5  /  DBili  x   /  AST  41<H>  /  ALT  24  /  AlkPhos  280<H>  04-24-19 @ 06:10  TPro  6.5  /  Alb  3.5  /  TBili  0.5  /  DBili  x   /  AST  22  /  ALT  22  /  AlkPhos  351<H>  04-23-19 @ 04:05  TPro  6.3  /  Alb  3.3  /  TBili  0.5  /  DBili  x   /  AST  24  /  ALT  21  /  AlkPhos  325<H>  04-22-19 @ 16:53          PE:  General: NAD  Abdomen: soft, moderately distended. BSx4. Appropriately tender, incision c/d/i.  Extremities: No erythema, no pitting edema    A/P: 35yo POD#2 s/p LTCS. Patient vomiting is unlikely to be due to an ileus; bowel sounds intact and patient passing flatus. Eating was not impetus behind vomiting as patient has not eaten yet today.   - zofran prn  - encouraged patient not to continue prune or any other juice right now in case of gastric irritation    - discussed light and clear diet for right now until nausea resolves  - Increase ambulation.    Shalonda Palomino PGY1      Att Note: Pt in NICU on few attempts to be seen by me.  Aware of small amount emesis this am.Spoke with nurse at this time and informed that pt tolerated regular diet for lunch.  Angie French MD

## 2019-04-25 NOTE — DISCHARGE NOTE OB - PLAN OF CARE
home with routine postpartum care After discharge, please stay on pelvic rest for 6 weeks, meaning no sexual intercourse, no tampons and no douching.  No driving for 2 weeks as women can loose a lot of blood during delivery and there is a possibility of being lightheaded/fainting.  No lifting objects heavier than baby for two weeks.  Expect to have vaginal bleeding/spotting for up to six weeks.  The bleeding should get lighter and more white/light brown with time.  For bleeding soaking more than a pad an hour or passing clots greater than the size of your fist, come in to the emergency department.    Follow up in clinic in 1 week for blood pressure check and in 2 weeks for incision check.  Call clinic for noticeable increase in redness or swelling at incision, discharge from incision, or opening of skin at incision site. After discharge, please stay on pelvic rest for 6 weeks, meaning no sexual intercourse, no tampons and no douching.  No driving for 2 weeks as women can loose a lot of blood during delivery and there is a possibility of being lightheaded/fainting.  No lifting objects heavier than baby for two weeks.  Expect to have vaginal bleeding/spotting for up to six weeks.  The bleeding should get lighter and more white/light brown with time.  For bleeding soaking more than a pad an hour or passing clots greater than the size of your fist, come in to the emergency department.    Follow up in clinic in 1 week for blood pressure check and in 2 weeks for incision check.  Call clinic for noticeable increase in redness or swelling at incision, discharge from incision, or opening of skin at incision site. Take your blood pressure at home, once a day, in the morning at rest. Please call your doctor and go to the emergency room if your blood pressure is elevated above either 160 systolic (the top number) or 110 diastolic (the bottom number). Go to the emergency room immediately for: shortness of breath, headache that does not resolve with medications, blurry vision, or seeing spots.

## 2019-04-26 VITALS
HEART RATE: 91 BPM | SYSTOLIC BLOOD PRESSURE: 145 MMHG | OXYGEN SATURATION: 99 % | DIASTOLIC BLOOD PRESSURE: 77 MMHG | RESPIRATION RATE: 18 BRPM | TEMPERATURE: 98 F

## 2019-04-26 RX ORDER — IBUPROFEN 200 MG
1 TABLET ORAL
Qty: 20 | Refills: 0 | OUTPATIENT
Start: 2019-04-26 | End: 2019-04-30

## 2019-04-26 RX ORDER — ACETAMINOPHEN 500 MG
3 TABLET ORAL
Qty: 60 | Refills: 0 | OUTPATIENT
Start: 2019-04-26 | End: 2019-04-30

## 2019-04-26 RX ORDER — ASPIRIN/CALCIUM CARB/MAGNESIUM 324 MG
1 TABLET ORAL
Qty: 0 | Refills: 0 | COMMUNITY

## 2019-04-26 RX ORDER — DOCUSATE SODIUM 100 MG
1 CAPSULE ORAL
Qty: 10 | Refills: 0 | OUTPATIENT
Start: 2019-04-26 | End: 2019-04-30

## 2019-04-26 RX ORDER — NORETHINDRONE 0.35 MG/1
1 TABLET ORAL
Qty: 28 | Refills: 11 | OUTPATIENT
Start: 2019-04-26 | End: 2020-03-26

## 2019-04-26 RX ADMIN — HEPARIN SODIUM 5000 UNIT(S): 5000 INJECTION INTRAVENOUS; SUBCUTANEOUS at 06:12

## 2019-04-26 RX ADMIN — BENZOCAINE AND MENTHOL 1 LOZENGE: 5; 1 LIQUID ORAL at 11:35

## 2019-04-26 RX ADMIN — SIMETHICONE 80 MILLIGRAM(S): 80 TABLET, CHEWABLE ORAL at 11:35

## 2019-04-26 NOTE — PROGRESS NOTE ADULT - ATTENDING COMMENTS
Patient seen and examined by me. Agree with resident assessment and plan. Continue postpartum care. Stable for discharge home.

## 2019-04-26 NOTE — PROGRESS NOTE ADULT - SUBJECTIVE AND OBJECTIVE BOX
OB Postpartum Note:  Delivery, POD#3    S: 33yo POD#3 s/p LTCS. The patient feels well.  Pain is well controlled. She is tolerating a regular diet and passing flatus. She is voiding spontaneously, and ambulating without difficulty. Denies CP/SOB. Denies lightheadedness/dizziness. Denies N/V. Denies blurry vision and headaches.    O:  Vitals:  Vital Signs Last 24 Hrs  T(C): 36.8 (2019 06:00), Max: 37.1 (2019 18:21)  T(F): 98.2 (2019 06:00), Max: 98.8 (2019 18:21)  HR: 90 (2019 06:00) (63 - 102)  BP: 134/78 (2019 06:00) (129/71 - 143/80)  BP(mean): --  RR: 18 (2019 06:00) (17 - 18)  SpO2: 99% (2019 06:00) (97% - 100%)    MEDICATIONS  (STANDING):  acetaminophen   Tablet .. 975 milliGRAM(s) Oral every 6 hours  ferrous    sulfate 325 milliGRAM(s) Oral daily  heparin  Injectable 5000 Unit(s) SubCutaneous every 12 hours  ibuprofen  Tablet. 600 milliGRAM(s) Oral every 6 hours  lactated ringers Bolus 500 milliLiter(s) IV Bolus once  oxyCODONE    IR 5 milliGRAM(s) Oral every 3 hours  prenatal multivitamin 1 Tablet(s) Oral daily    MEDICATIONS  (PRN):  ALBUTerol    0.083%. 2.5 milliGRAM(s) Nebulizer once PRN Shortness of Breath and/or Wheezing  benzocaine 15 mG/menthol 3.6 mG (Sugar-Free) Lozenge 1 Lozenge Oral every 2 hours PRN Sore Throat  diphenhydrAMINE 25 milliGRAM(s) Oral every 6 hours PRN Itching  docusate sodium 100 milliGRAM(s) Oral two times a day PRN Stool Softening  glycerin Suppository - Adult 1 Suppository(s) Rectal at bedtime PRN Constipation  lanolin Ointment 1 Application(s) Topical every 3 hours PRN Sore Nipples  magnesium hydroxide Suspension 30 milliLiter(s) Oral daily PRN Constipation  oxyCODONE    IR 5 milliGRAM(s) Oral every 4 hours PRN Severe Pain (7 - 10)  simethicone 80 milliGRAM(s) Chew every 4 hours PRN Gas      LABS:  Blood type: B Positive  Rubella IgG: Positive ( @ 13:23)  RPR: Negative                          11.8   18.55<H> >-----------< 184    (  @ 06:10 )             36.8                        12.5   20.97<H> >-----------< 179    (  @ 17:30 )             38.4    19 @ 06:10      133<L>  |  99  |  11  ----------------------------<  160<H>  4.7   |  20<L>  |  0.74        Ca    7.7<L>      2019 06:10  Mg     5.7<H>     -24  Mg     5.6<H>       Mg     5.9<H>     04-23  Mg     6.7<H>     -23    TPro  5.9<L>  /  Alb  3.1<L>  /  TBili  0.5  /  DBili  x   /  AST  41<H>  /  ALT  24  /  AlkPhos  280<H>  19 @ 06:10          Physical exam:  Gen: NAD  Abdomen: Soft, nontender, no distension , firm uterine fundus at umbilicus.  Incision: Clean, dry, and intact   Pelvic: Normal lochia noted  Ext: No calf tenderness

## 2019-04-26 NOTE — PROGRESS NOTE ADULT - ASSESSMENT
A/P: 33yo PMH CP POD#3 s/p LTCS c/b siPEC now s/p Mg. BPs well controlled overnight, no concern for severe features at this time.

## 2019-04-29 ENCOUNTER — APPOINTMENT (OUTPATIENT)
Dept: OBGYN | Facility: HOSPITAL | Age: 34
End: 2019-04-29
Payer: MEDICAID

## 2019-04-29 ENCOUNTER — OUTPATIENT (OUTPATIENT)
Dept: OUTPATIENT SERVICES | Facility: HOSPITAL | Age: 34
LOS: 1 days | End: 2019-04-29

## 2019-04-29 VITALS
BODY MASS INDEX: 26.32 KG/M2 | DIASTOLIC BLOOD PRESSURE: 96 MMHG | WEIGHT: 122 LBS | SYSTOLIC BLOOD PRESSURE: 158 MMHG | HEIGHT: 57 IN | HEART RATE: 105 BPM

## 2019-04-29 DIAGNOSIS — Z98.890 OTHER SPECIFIED POSTPROCEDURAL STATES: Chronic | ICD-10-CM

## 2019-04-29 PROBLEM — J45.909 UNSPECIFIED ASTHMA, UNCOMPLICATED: Chronic | Status: ACTIVE | Noted: 2019-04-22

## 2019-04-29 PROCEDURE — 99213 OFFICE O/P EST LOW 20 MIN: CPT | Mod: GC,25

## 2019-04-29 NOTE — HISTORY OF PRESENT ILLNESS
[Postpartum Follow Up] : postpartum follow up [Complications:___] : complications include: [unfilled] [Delivery Date: ___] : on [unfilled] [Primary C/S] : delivered by  section [NICU: ___] : NICU: [unfilled] [None] : The patient is currently asymptomatic [Clean/Dry/Intact] : clean, dry and intact [Breastfeeding] : not currently nursing [Abdominal Pain] : no abdominal pain [Back Pain] : no back pain [Heavy Bleeding] : no heavy bleeding [Chills] : no chills [Fatigue] : no fatigue [Dysuria] : no dysuria [Fever] : no fever [Headache] : no headache [Nausea] : no nausea [Vomiting] : no vomiting [Erythema] : not erythematous [Swelling] : not swollen [Dehiscence] : not dehisced [de-identified] : w

## 2019-04-30 ENCOUNTER — APPOINTMENT (OUTPATIENT)
Dept: NEUROLOGY | Facility: HOSPITAL | Age: 34
End: 2019-04-30

## 2019-05-02 ENCOUNTER — NON-APPOINTMENT (OUTPATIENT)
Age: 34
End: 2019-05-02

## 2019-05-03 LAB — SURGICAL PATHOLOGY STUDY: SIGNIFICANT CHANGE UP

## 2019-07-09 ENCOUNTER — APPOINTMENT (OUTPATIENT)
Dept: OBGYN | Facility: HOSPITAL | Age: 34
End: 2019-07-09

## 2019-08-01 ENCOUNTER — APPOINTMENT (OUTPATIENT)
Dept: OBGYN | Facility: HOSPITAL | Age: 34
End: 2019-08-01

## 2019-08-01 ENCOUNTER — LABORATORY RESULT (OUTPATIENT)
Age: 34
End: 2019-08-01

## 2019-08-01 ENCOUNTER — OUTPATIENT (OUTPATIENT)
Dept: OUTPATIENT SERVICES | Facility: HOSPITAL | Age: 34
LOS: 1 days | End: 2019-08-01

## 2019-08-01 VITALS
BODY MASS INDEX: 25.03 KG/M2 | WEIGHT: 116 LBS | SYSTOLIC BLOOD PRESSURE: 132 MMHG | HEART RATE: 67 BPM | HEIGHT: 57 IN | DIASTOLIC BLOOD PRESSURE: 84 MMHG

## 2019-08-01 DIAGNOSIS — Z98.890 OTHER SPECIFIED POSTPROCEDURAL STATES: Chronic | ICD-10-CM

## 2019-08-01 LAB
BASOPHILS # BLD AUTO: 0.05 K/UL — SIGNIFICANT CHANGE UP (ref 0–0.2)
BASOPHILS NFR BLD AUTO: 1.2 % — SIGNIFICANT CHANGE UP (ref 0–2)
EOSINOPHIL # BLD AUTO: 0.1 K/UL — SIGNIFICANT CHANGE UP (ref 0–0.5)
EOSINOPHIL NFR BLD AUTO: 2.4 % — SIGNIFICANT CHANGE UP (ref 0–6)
HBA1C BLD-MCNC: 5.5 % — SIGNIFICANT CHANGE UP (ref 4–5.6)
HCT VFR BLD CALC: 40 % — SIGNIFICANT CHANGE UP (ref 34.5–45)
HGB BLD-MCNC: 13 G/DL — SIGNIFICANT CHANGE UP (ref 11.5–15.5)
IMM GRANULOCYTES NFR BLD AUTO: 0.2 % — SIGNIFICANT CHANGE UP (ref 0–1.5)
LYMPHOCYTES # BLD AUTO: 1.44 K/UL — SIGNIFICANT CHANGE UP (ref 1–3.3)
LYMPHOCYTES # BLD AUTO: 35 % — SIGNIFICANT CHANGE UP (ref 13–44)
MCHC RBC-ENTMCNC: 28.8 PG — SIGNIFICANT CHANGE UP (ref 27–34)
MCHC RBC-ENTMCNC: 32.5 % — SIGNIFICANT CHANGE UP (ref 32–36)
MCV RBC AUTO: 88.7 FL — SIGNIFICANT CHANGE UP (ref 80–100)
MONOCYTES # BLD AUTO: 0.36 K/UL — SIGNIFICANT CHANGE UP (ref 0–0.9)
MONOCYTES NFR BLD AUTO: 8.8 % — SIGNIFICANT CHANGE UP (ref 2–14)
NEUTROPHILS # BLD AUTO: 2.15 K/UL — SIGNIFICANT CHANGE UP (ref 1.8–7.4)
NEUTROPHILS NFR BLD AUTO: 52.4 % — SIGNIFICANT CHANGE UP (ref 43–77)
NRBC # FLD: 0 K/UL — SIGNIFICANT CHANGE UP (ref 0–0)
PLATELET # BLD AUTO: 281 K/UL — SIGNIFICANT CHANGE UP (ref 150–400)
PMV BLD: 10.5 FL — SIGNIFICANT CHANGE UP (ref 7–13)
RBC # BLD: 4.51 M/UL — SIGNIFICANT CHANGE UP (ref 3.8–5.2)
RBC # FLD: 11.9 % — SIGNIFICANT CHANGE UP (ref 10.3–14.5)
WBC # BLD: 4.11 K/UL — SIGNIFICANT CHANGE UP (ref 3.8–10.5)
WBC # FLD AUTO: 4.11 K/UL — SIGNIFICANT CHANGE UP (ref 3.8–10.5)

## 2019-08-01 NOTE — HISTORY OF PRESENT ILLNESS
[Postpartum Follow Up] : postpartum follow up [Complications:___] : complications include: [unfilled] [Delivery Date: ___] : on [unfilled] [Male] : Delivery History: baby boy [Breastfeeding] : currently nursing [BF with Difficulty] : nursing without difficulty [Resumed Menses] : has resumed her menses [Resumed [place]] : has not resumed intercourse [Intended Contraception] : Intended Contraception: [Oral Contraceptives] : oral contraceptives [None] : No associated symptoms are reported [Clean/Dry/Intact] : clean, dry and intact [Erythema] : not erythematous [Swelling] : not swollen [Dehiscence] : not dehisced [Back to Normal] : is back to normal in size [Doing Well] : is doing well [No Sign of Infection] : is showing no signs of infection [Excellent Pain Control] : has excellent pain control [de-identified] : A1C, CBC. Norethindrone .[facility]. [de-identified] : Doing well postpartum. Not interested in contraception at this time as she is not having sex, however encouraged to  progesterone only pills prescribed at delivery. Re-[facility] to [facility] today and pt given instrctions for use. BP ok okay. Per JESSICA Pérez, prefer 2 hr GTT to confirm dx of T2DM, however patient is not fasting today. A1C performed.

## 2019-09-17 NOTE — OB RN TRIAGE NOTE - PMH
No chief complaint on file. Subjective:      Patient : This patient is a .35 yo black male that presents with a chief complaint of fever , chills , muscle aches and fatigues. The symptoms have been present for 1 days. They have improved. . The patient did not receive a flu vaccine in 2019     Loose bowels 7 times with abd cramps last night  No travel  No new meds. Objective:     Visit Vitals  /80 (BP 1 Location: Right arm, BP Patient Position: Sitting)   Pulse (!) 103   Temp 97.8 °F (36.6 °C) (Oral)   Resp 18   Ht 5' 9\" (1.753 m)   Wt (!) 370 lb (167.8 kg)   SpO2 97%   BMI 54.64 kg/m²         Skin:  warm  HEENT:  conjunctival erythema  Heart regular rhythm  Lungs: clear to auscultation and percussion throughout both lung fields  CV:normal  Abdomen tender, no guarding  and no rebound  Extremeties:no clubbing, cyanosis, edema  Neuro alert, oriented x 3      Past Medical History:   Diagnosis Date    DM (diabetes mellitus) (HCC) Age 31    HLD (hyperlipidaemia)     HTN      Family History   Problem Relation Age of Onset    Diabetes Mother     Heart Attack Father 61    Diabetes Maternal Grandmother     Diabetes Maternal Grandfather     Diabetes Paternal Grandmother     Diabetes Paternal Grandfather      Current Outpatient Medications   Medication Sig Dispense Refill    insulin lispro (HUMALOG U-100 INSULIN) 100 unit/mL injection USE AS DIRECTED FOR INSULIN PUMP UP  UNITS PER  mL 3    insulin lispro (HUMALOG U-100 INSULIN) 100 unit/mL injection USE 12 UNITS IN PUMP EVERY 10 HOURS PLUS BOLUSES 10 mL 0    chlorthalidone (HYGROTEN) 25 mg tablet TAKE 1 TABLET DAILY 90 Tab 1    losartan (COZAAR) 100 mg tablet TAKE 1 TABLET DAILY 90 Tab 1    metFORMIN ER (GLUCOPHAGE XR) 500 mg tablet TAKE 2 TABLETS WITH BREAKFAST AND DINNER 360 Tab 1    atorvastatin (LIPITOR) 10 mg tablet Take 1 Tab by mouth daily. 90 Tab 3    Subcutaneous Insulin Pump Misc by Does Not Apply route.       aspirin 81 mg tablet Take  by mouth. Allergies   Allergen Reactions    Lisinopril Cough     Social History     Socioeconomic History    Marital status:      Spouse name: Not on file    Number of children: Not on file    Years of education: Not on file    Highest education level: Not on file   Occupational History    Not on file   Social Needs    Financial resource strain: Not on file    Food insecurity:     Worry: Not on file     Inability: Not on file    Transportation needs:     Medical: Not on file     Non-medical: Not on file   Tobacco Use    Smoking status: Never Smoker    Smokeless tobacco: Never Used   Substance and Sexual Activity    Alcohol use: Yes     Alcohol/week: 0.0 standard drinks     Comment: glass of wine 1-2 times a month    Drug use: No    Sexual activity: Yes     Partners: Female   Lifestyle    Physical activity:     Days per week: Not on file     Minutes per session: Not on file    Stress: Not on file   Relationships    Social connections:     Talks on phone: Not on file     Gets together: Not on file     Attends Congregation service: Not on file     Active member of club or organization: Not on file     Attends meetings of clubs or organizations: Not on file     Relationship status: Not on file    Intimate partner violence:     Fear of current or ex partner: Not on file     Emotionally abused: Not on file     Physically abused: Not on file     Forced sexual activity: Not on file   Other Topics Concern    Not on file   Social History Narrative    Lives in [address] [place] [address] with wife and 26 yo son, 15 yo son, and 9 yo daughter. Works as a  for [employer]. Likes to [employer] (formerly [employer]). Assessment:   Flu      Plan:     1. Supportive care including activity as tolerated, increased fluid intake, tylenol 650 mg every 4 to 6 hours or ibuprofen 400 - 600 mg every 6-8 hours as needed. Diagnoses and all orders for this visit:    1.  Gastroenteritis and colitis, viral      Fluids push  gatorade  Imodium as needed for diarrhea  Control diabetes with pump Cerebral Palsy    Mental Retardation Asthma  albuterol PRN  Cerebral Palsy    Mental Retardation

## 2019-10-23 ENCOUNTER — OTHER (OUTPATIENT)
Age: 34
End: 2019-10-23

## 2019-11-25 ENCOUNTER — APPOINTMENT (OUTPATIENT)
Dept: INTERNAL MEDICINE | Facility: CLINIC | Age: 34
End: 2019-11-25
Payer: MEDICAID

## 2019-11-25 ENCOUNTER — OUTPATIENT (OUTPATIENT)
Dept: OUTPATIENT SERVICES | Facility: HOSPITAL | Age: 34
LOS: 1 days | End: 2019-11-25

## 2019-11-25 VITALS
HEIGHT: 57 IN | SYSTOLIC BLOOD PRESSURE: 145 MMHG | DIASTOLIC BLOOD PRESSURE: 100 MMHG | BODY MASS INDEX: 23.73 KG/M2 | WEIGHT: 110 LBS

## 2019-11-25 DIAGNOSIS — G80.9 CEREBRAL PALSY, UNSPECIFIED: ICD-10-CM

## 2019-11-25 DIAGNOSIS — Z98.890 OTHER SPECIFIED POSTPROCEDURAL STATES: Chronic | ICD-10-CM

## 2019-11-25 PROCEDURE — 99213 OFFICE O/P EST LOW 20 MIN: CPT | Mod: GE

## 2019-11-25 RX ORDER — LANCETS
EACH MISCELLANEOUS
Qty: 1 | Refills: 5 | Status: DISCONTINUED | COMMUNITY
Start: 2019-01-09 | End: 2019-11-25

## 2019-11-25 RX ORDER — PEN NEEDLE, DIABETIC 32GX 5/32"
32G X 4 MM NEEDLE, DISPOSABLE MISCELLANEOUS
Qty: 2 | Refills: 0 | Status: DISCONTINUED | COMMUNITY
Start: 2019-01-17 | End: 2019-11-25

## 2019-11-25 RX ORDER — BLOOD-GLUCOSE METER
W/DEVICE KIT MISCELLANEOUS
Qty: 1 | Refills: 0 | Status: DISCONTINUED | COMMUNITY
Start: 2019-01-14 | End: 2019-11-25

## 2019-11-25 RX ORDER — ALBUTEROL SULFATE 90 UG/1
108 (90 BASE) AEROSOL, METERED RESPIRATORY (INHALATION)
Qty: 1 | Refills: 2 | Status: DISCONTINUED | COMMUNITY
End: 2019-11-25

## 2019-11-25 RX ORDER — INSULIN LISPRO 100 [IU]/ML
100 INJECTION, SOLUTION INTRAVENOUS; SUBCUTANEOUS
Qty: 1 | Refills: 4 | Status: DISCONTINUED | COMMUNITY
Start: 2019-03-12 | End: 2019-11-25

## 2019-11-25 RX ORDER — ISOPROPYL ALCOHOL 70 ML/100ML
SWAB TOPICAL
Qty: 1 | Refills: 2 | Status: DISCONTINUED | COMMUNITY
Start: 2019-01-17 | End: 2019-11-25

## 2019-11-25 RX ORDER — BLOOD SUGAR DIAGNOSTIC
STRIP MISCELLANEOUS 4 TIMES DAILY
Qty: 100 | Refills: 5 | Status: DISCONTINUED | COMMUNITY
Start: 2019-01-09 | End: 2019-11-25

## 2019-11-25 RX ORDER — INSULIN GLARGINE 100 [IU]/ML
100 INJECTION, SOLUTION SUBCUTANEOUS AT BEDTIME
Qty: 5 | Refills: 3 | Status: DISCONTINUED | COMMUNITY
Start: 2019-01-17 | End: 2019-11-25

## 2019-11-25 RX ORDER — EPINEPHRINE 0.3 MG/.3ML
0.3 INJECTION INTRAMUSCULAR
Qty: 1 | Refills: 0 | Status: DISCONTINUED | COMMUNITY
Start: 2018-11-21 | End: 2019-11-25

## 2019-11-25 RX ORDER — NORETHINDRONE 0.35 MG/1
0.35 TABLET ORAL DAILY
Qty: 1 | Refills: 5 | Status: DISCONTINUED | COMMUNITY
Start: 2019-08-01 | End: 2019-11-25

## 2019-11-25 RX ORDER — INSULIN LISPRO 100 [IU]/ML
100 INJECTION, SOLUTION INTRAVENOUS; SUBCUTANEOUS
Qty: 3 | Refills: 3 | Status: DISCONTINUED | COMMUNITY
Start: 2019-01-17 | End: 2019-11-25

## 2019-11-25 RX ORDER — INFLUENZA A VIRUS A/VICTORIA/2570/2019 IVR-215 (H1N1) ANTIGEN (FORMALDEHYDE INACTIVATED), INFLUENZA A VIRUS A/TASMANIA/503/2020 IVR-221 (H3N2) ANTIGEN (FORMALDEHYDE INACTIVATED), INFLUENZA B VIRUS B/PHUKET/3073/2013 ANTIGEN (FORMALDEHYDE INACTIVATED), AND INFLUENZA B VIRUS B/WASHINGTON/02/2019 ANTIGEN (FORMALDEHYDE INACTIVATED) 15; 15; 15; 15 UG/.5ML; UG/.5ML; UG/.5ML; UG/.5ML
INJECTION, SUSPENSION INTRAMUSCULAR
Qty: 1 | Refills: 0 | Status: DISCONTINUED | COMMUNITY
Start: 2018-12-03 | End: 2019-11-25

## 2019-11-26 DIAGNOSIS — G80.9 CEREBRAL PALSY, UNSPECIFIED: ICD-10-CM

## 2019-11-26 NOTE — PHYSICAL EXAM
[No Acute Distress] : no acute distress [Well Nourished] : well nourished [Normal Sclera/Conjunctiva] : normal sclera/conjunctiva [PERRL] : pupils equal round and reactive to light [EOMI] : extraocular movements intact [Normal Oropharynx] : the oropharynx was normal [Normal Outer Ear/Nose] : the outer ears and nose were normal in appearance [No JVD] : no jugular venous distention [No Respiratory Distress] : no respiratory distress  [Clear to Auscultation] : lungs were clear to auscultation bilaterally [No Accessory Muscle Use] : no accessory muscle use [Normal Rate] : normal rate  [Regular Rhythm] : with a regular rhythm [Normal S1, S2] : normal S1 and S2 [No Murmur] : no murmur heard [Non Tender] : non-tender [Soft] : abdomen soft [Non-distended] : non-distended [Normal Bowel Sounds] : normal bowel sounds [Grossly Normal Strength/Tone] : grossly normal strength/tone [No Rash] : no rash [No Lymphadenopathy] : no lymphadenopathy [No Edema] : there was no peripheral edema [Normal Mood] : the mood was normal [Normal Affect] : the affect was normal [de-identified] : Developmental delay, slurred speech  [de-identified] : Difficulty with coordination, shuffling gait, stooped posture  [de-identified] : Developmentally delayed, slurred speech

## 2019-11-26 NOTE — HISTORY OF PRESENT ILLNESS
[FreeTextEntry1] : Case management  [de-identified] : 35 y/o female with past medical history of cerebral palsy, seizure disorder presenting today requesting to be set up with case management. Patient recently had her first child who is now 6 months old, and reports that things are going well. Patient reports that she has no physical complaints at this visit and has recovered well after her . Patient already spoke with Social work regarding case management and the necessary arrangements will be made. Patient reports she regularly checks her BP at home and the readings are usually ~100/70 - 140/80. Patient reports that her last seizure was ~8 years ago and has been well controlled off of medications. Pt reports that her boyfriend passed away 2 months ago, but patient denies feelings of depression at this time. \par \par Specifically patient is requesting help with transportation from social work in this clinic. Additionally patient would like assistance with getting a care manager at [employer], a program the patient used to belong to in order to get assistance with housing and home health services. Patient currently lives with her grandmother but would like assistance applying for Section 8 housing. \par

## 2019-11-26 NOTE — PLAN
[FreeTextEntry1] : 35 y/o female with past medical history of cerebral palsy, seizure disorder. \par \par # Case Management \par - Social work has been notified and will help with transportation arrangements\par - Patient is applying for  with [employer] program \par \par # Cerebral palsy\par - Patient has capacity to make decisions on her own and has capacity to receive her own SSI benefits\par - Provided a letter to her during the visit to start her application\par \par # Seizure disorder - controlled off antiepileptic meds \par \par #HCM\par - up to date with immunizations \par - HPV + in 2014, repeat HPV genotype and Pap smear negative in 2015 \par \par RTC in 5 weeks to re-establish care \par \par Stephany Brown PGY-1 \par Discussed with Dr. Odell \par [employer] 5

## 2019-12-04 ENCOUNTER — APPOINTMENT (OUTPATIENT)
Dept: INTERNAL MEDICINE | Facility: CLINIC | Age: 34
End: 2019-12-04

## 2020-06-22 ENCOUNTER — APPOINTMENT (OUTPATIENT)
Dept: INTERNAL MEDICINE | Facility: CLINIC | Age: 35
End: 2020-06-22

## 2020-07-30 ENCOUNTER — APPOINTMENT (OUTPATIENT)
Dept: INTERNAL MEDICINE | Facility: CLINIC | Age: 35
End: 2020-07-30

## 2020-11-23 ENCOUNTER — NON-APPOINTMENT (OUTPATIENT)
Age: 35
End: 2020-11-23

## 2020-12-11 ENCOUNTER — APPOINTMENT (OUTPATIENT)
Dept: INTERNAL MEDICINE | Facility: CLINIC | Age: 35
End: 2020-12-11

## 2021-01-27 NOTE — ED ADULT NURSE NOTE - CAS EDN DISCHARGE ASSESSMENT
Consent was obtained from the patient. The risks and benefits to therapy were discussed in detail. Specifically, the risks of infection, scarring, bleeding, prolonged wound healing, incomplete removal, allergy to anesthesia, nerve injury and recurrence were addressed. Prior to the procedure, the treatment site was clearly identified and confirmed by the patient. All components of Universal Protocol/PAUSE Rule completed. Alert and oriented to person, place and time

## 2021-02-24 ENCOUNTER — APPOINTMENT (OUTPATIENT)
Dept: INTERNAL MEDICINE | Facility: CLINIC | Age: 36
End: 2021-02-24

## 2021-05-18 ENCOUNTER — APPOINTMENT (OUTPATIENT)
Dept: INTERNAL MEDICINE | Facility: CLINIC | Age: 36
End: 2021-05-18

## 2021-09-22 ENCOUNTER — APPOINTMENT (OUTPATIENT)
Dept: INTERNAL MEDICINE | Facility: CLINIC | Age: 36
End: 2021-09-22

## 2021-10-26 ENCOUNTER — APPOINTMENT (OUTPATIENT)
Dept: INTERNAL MEDICINE | Facility: CLINIC | Age: 36
End: 2021-10-26

## 2021-11-02 NOTE — PROVIDER CONTACT NOTE (OTHER) - NAME OF MD/NP/PA/DO NOTIFIED:
Shalonda Palomino What Type Of Note Output Would You Prefer (Optional)?: Standard Output How Severe Is Your Acne?: moderate Is This A New Presentation, Or A Follow-Up?: Acne

## 2022-05-13 ENCOUNTER — APPOINTMENT (OUTPATIENT)
Dept: OBGYN | Facility: HOSPITAL | Age: 37
End: 2022-05-13

## 2022-06-17 ENCOUNTER — APPOINTMENT (OUTPATIENT)
Dept: OBGYN | Facility: HOSPITAL | Age: 37
End: 2022-06-17

## 2022-07-08 ENCOUNTER — APPOINTMENT (OUTPATIENT)
Dept: OBGYN | Facility: HOSPITAL | Age: 37
End: 2022-07-08

## 2022-08-04 ENCOUNTER — APPOINTMENT (OUTPATIENT)
Dept: OBGYN | Facility: HOSPITAL | Age: 37
End: 2022-08-04

## 2022-09-20 ENCOUNTER — APPOINTMENT (OUTPATIENT)
Dept: OBGYN | Facility: HOSPITAL | Age: 37
End: 2022-09-20

## 2023-03-15 NOTE — ED ADULT NURSE NOTE - NSSISCREENINGQ1_ED_A_ED
No Slit Excision Additional Text (Leave Blank If You Do Not Want): A linear line was drawn on the skin overlying the lesion. An incision was made slowly until the lesion was visualized.  Once visualized, the lesion was removed with blunt dissection.

## 2023-03-29 NOTE — OB PROVIDER TRIAGE NOTE - NS_FETALMONCTXRES_OBGYN_ALL_OB
Subjective





- Prog Note Date


Prog Note Date: 03/29/23


Prog Note Time: 17:42





- Subjective


Pt reports feeling: No change


Subjective: 





She is very quiet.  She sleeps most of the day.  When you call her name and 

touch her shoulder she will open her eyes and look at you.  Voice is very low, 

almost an audible but she does answer.





Current Medications





- Current Medications


Current Medications: 





Active Medications





Aspirin (Aspirin Ec 81 Mg Tablet)  81 mg PO DAILY [facility]


Carbidopa/Levodopa (Carbidopa/Levodopa Er 25 Mg/100 Mg Tablet)  1 tab PO BID [facility]


Carbidopa/Levodopa (Carbidopa/Levodopa 25 Mg/100 Mg Tablet)  1 tab PO 5XD [facility]


Cholecalciferol (Cholecalciferol 5,000 Unit Capsule)  50,000 unit PO Q30D [facility]


Enoxaparin Sodium (Enoxaparin 30 Mg/0.3 Ml Syringe)  30 mg SUBQ DAILY [facility]


   Last Admin: 03/29/23 09:03 Dose:  30 mg


   


Dextrose/Sodium Chloride (D5ns)  1,000 mls @ 100 mls/hr IV .Q10H [facility]


   Last Admin: 03/29/23 15:21 Dose:  100 mls/hr


   


Acetaminophen (Acetaminophen)  1,000 mg in 100 mls @ 400 mls/hr IV Q6HR PRN


   PRN Reason: Pain or Fever > 38C (100.4F)


Ceftriaxone Sodium 2 gm/ (Sodium Chloride)  100 mls @ 200 mls/hr IV DAILY [facility]


   Last Infusion: 03/29/23 09:44 Dose:  Infused


   


Lidocaine (Lidocaine Patch 5%)  1 patch TOP DAILY PRN


   PRN Reason: PAIN


Metoprolol Succinate (Metoprolol Succinate 25 Mg Tablet)  12.5 mg PO QPM [facility]


Mirtazapine (Mirtazapine 15 Mg Tablet)  7.5 mg PO QPM [facility]


Multi-Ingredient Ointment (Zinc Oxide 20% Oint 30 Gm Tube)  1 applic TOP PRN PRN


   PRN Reason: Skin Care


   Last Admin: 03/27/23 08:37 Dose:  1 applic


   


Multivitamins/Minerals (Multivitamin W/Minerals Tablet)  1 tab PO DAILYWM [facility]


   Last Admin: 03/29/23 09:03 Dose:  1 tab


   


Non-Formulary Medication (D-Mannose [Azo D-Mannose])  500 mg PO DAILY [facility]


Pramipexole Dihydrochloride (Pramipexole 0.25 Mg Tablet)  0.25 mg PO QPM [facility]


Sodium Chloride (Sodium Chloride Flush 0.9% 10 Ml Syringe)  10 ml IVP PRN PRN


   PRN Reason: AS NEEDED PER PROVIDER ORDERS


Sodium Chloride (Sodium Chloride Flush 0.9% 10 Ml Syringe)  10 ml IVP 

0100,0900,1700 [facility]


   Last Admin: 03/29/23 16:54 Dose:  Not Given


   





                                        





Carbidopa/Levodopa [Carbidopa-Levo ER  Tab] 1 tab PO BID 04/30/17 


Ipratropium Bromide 2 sprays CANDE BID 04/30/17 


Mirtazapine 7.5 mg PO QPM 04/30/17 


Pramipexole [Mirapex] 0.25 mg PO QPM 04/30/17 


Acetaminophen [Tylenol] 650 mg PO Q4H PRN 01/11/23 


Aspirin [Aspirin EC] 81 mg PO DAILY 01/11/23 


Carbidopa/Levodopa 25/100 [Sinemet 25 mg/100 mg] 1 tab PO 5XD 01/11/23 


D-Mannose [Azo D-Mannose] 500 mg PO DAILY 01/11/23 


Estrogens, Conjugated Cream [Premarin Cream] 0.5 g VG MOFR 01/11/23 


Lidocaine Patch 5% [Lidoderm Patch] 1 patch TOP DAILY PRN 01/11/23 


Metoprolol Succinate [Toprol Xl] 12.5 mg PO QPM 01/11/23 


Sennosides [Senna] 17.2 mg PO DAILY 01/11/23 


polyethylene glycoL 3350 [Miralax] 17 g PO DAILY 01/11/23 


Cholecalciferol (Vitamin D3) [Vitamin D3] 1,250 mcg PO Q30D 03/26/23 











Objective





- Vital Signs/Intake & Output


Reviewed Vital Signs: Yes


Vital Signs: 


                                Vital Signs x48h











  Temp Pulse Resp BP Pulse Ox


 


 03/29/23 16:22  36.3 C L  91  24  163/95 H  96


 


 03/29/23 15:29  36.3 C L  93  24  154/104 H  96


 


 03/29/23 12:08  36.6 C  88  24  145/86 H  97











Intake & Output: 


                                 Intake & Output











 03/26/23 03/27/23 03/28/23 03/29/23





 23:59 23:59 23:59 23:59


 


Intake Total 4087.5 2875.000 3290.000 2501.667


 


Output Total 75 800 1000 1600


 


Balance 4012.5 2075.000 2290.000 901.667














- Objective


General Appearance: positive: No acute distress, Other (Very frail, cachectic 

elderly woman who does not move.  She relies completely on nursing to rotate and

protect her skin and body)


Eyes Bilateral: positive: PERRL, EOMI


ENT: positive: Dry mucous membranes


Neck: positive: No JVD.  negative: Stiff neck


Respiratory: positive: No respiratory distress (But deep upper airway breath 

sounds.)


Cardiovascular: positive: Regular rate & rhythm, Systolic murmur


Abdomen: positive: Non-tender, No organomegaly, Nml bowel sounds


Skin: positive: Warm, Dry


Extremities: positive: Other (Knees are bent, legs turn inward, and nursing is 

protecting from pressure ulcers by having pillows on the outer edges of her 

legs, between her knees, between her ankles)


Neurologic/Psychiatric: positive: CN's nml (2-12), Slurred/abnml speech (Almost 

inaudible, but does respond to questions to deny pain).  negative: Motor nml 

(Complete generalized weakness where she is dependent on nursing to rotator, 

feed are)





- Lab Results


Fish Bones: 


                                 03/29/23 08:00





                                 03/29/23 08:00


Other Labs: 


                               Lab Results x24hrs











  03/29/23 03/29/23 Range/Units





  08:00 08:00 


 


WBC  8.3   (4.8-10.8)  x10^3/uL


 


RBC  4.16 L   (4.20-5.40)  10^6/uL


 


Hgb  11.1 L   (12.0-16.0)  g/dL


 


Hct  36.7 L   (37.0-47.0)  %


 


MCV  88.2   (81.0-99.0)  fL


 


MCH  26.7 L   (27.0-31.0)  pg


 


MCHC  30.2 L   (32.0-36.0)  g/dL


 


RDW  15.0   (12.0-15.0)  %


 


Plt Count  114 L   (130-450)  10^3/uL


 


MPV  11.4 H   (7.9-10.8)  fL


 


Neut # (Auto)  6.9 H   (1.5-6.6)  10^3/uL


 


Lymph # (Auto)  0.9 L   (1.5-3.5)  10^3/uL


 


Mono # (Auto)  0.3   (0.0-1.0)  10^3/uL


 


Eos # (Auto)  0.1   (0.0-0.7)  10^3/uL


 


Baso # (Auto)  0.0   (0.0-0.1)  10^3/uL


 


Absolute Nucleated RBC  0.00   x10^3/uL


 


Nucleated RBC %  0.0   /100WBC


 


Sodium   154 H  (135-145)  mmol/L


 


Potassium   3.4 L  (3.5-5.0)  mmol/L


 


Chloride   127 H*  (101-111)  mmol/L


 


Carbon Dioxide   25  (21-32)  mmol/L


 


Anion Gap   2.0 L  (6-13)  


 


BUN   20  (6-20)  mg/dL


 


Creatinine   0.4  (0.4-1.0)  mg/dL


 


Estimated GFR (MDRD)   150  (>89)  


 


Glucose   112 H  ()  mg/dL


 


Calcium   8.3 L  (8.5-10.3)  mg/dL














ABX Reporting


Has patient been on IV antibiotics over the past 48 hours?: Yes





Sepsis Event Note (H)





- Evaluation


Current Stage of Sepsis: Septic shock


Possible source of Sepsis: positive: Pulmonary, Genitourinary





- Sepsis Criteria


Sepsis Criteria: Respiratory: Increasing oxygen requirements, CNS: altered 

consciousness (unrelated to primary neuro pathology), MAP less than 65 mmHg, SBP

less than 90 mmHg, Metabolic: lactate > 2 mmol/L





Assessment/Plan





- Problem List


(1) Sepsis


Impression: 


Impression: 


 with shock. Resolved. 





The patient presented with a blood pressure of 69 systolic, tachycardic, very 

high white count of 30, elevated lactic acid and the source appears to be either

COVID or healthcare associated pneumonia or UTI. Since receiving antibiotics, 

she has defervesced.  Her last temperature was on March 26 and she has been 

afebrile since then.  There has been no tachycardia.  And blood pressure has 

come up today to 140/90 and 140/85 after being in the 120s for the last couple 

of days.  She is no longer hypoxic and is 97 to 99% on room air. Preliminary 

blood cultures have gram-negative bacilli.  PCR has identified it is Proteus and

today confirmed Blood culture is positive for Proteus.  It is resistant to 

ciprofloxacin, and intermediate sensitivity to levofloxacin.  Otherwise 

sensitive to ampicillin, Unasyn, cefepime, Ceftriaxone, Zosyn and Bactrim.  

Antibiotics have been ordered to treat both pneumonia and UTI.  As such she is 

on ceftriaxone day #5, and completed azithromycin 3 doses on 3/28.  While she 

has responded to treatment with normalization of her vital signs, and 

obtundation has improved to the point that she can at least respond to swallow 

food, she still has increased respiratory effort or sounds.  Those are slowly 

improving but she is very cachectic.  Overall prognosis for her is poor. She was

on D5 normal saline and I was going to stop her IV fluids.  But she is 

developing hyperchloremia, hypernatremia.


Plan:


She has been on 100 cc an hour, and I will change D5NS to D5 alone.


Because the patient's POLST indicated she wants comfort directed care, we will 

not plan to use pressors or put her in the ICU


I will continue ceftriaxone for 7-10 days while she is here.  





(2) LAINE (acute kidney injury) resolved. Hypokalemia. Hypernatremia. 


Conclusion/Plan: 


This patient's baseline creatinine is 0.4 (records and labs were reviewed). 

Presented on admission with BUN/creatinine of 44/2.3, consistent with marked 

volume depletion with this septic shock


                                Laboratory Tests











  03/26/23 03/27/23 03/28/23





  09:11 05:42 09:26


 


Creatinine  2.3 H  1.2 H  0.5





                                Laboratory Tests











  03/29/23





  08:00


 


Creatinine  0.4











She has responded well to fluid resuscitation antibiotics.  Her creatinine is 

now back to baseline.  I can infer that she is improved from that regard, but 

this unfortunate female is still quite lethargic, and a poor prognosis. 

Potassium is still low today.  This is in spite of potassium riders yesterday.  

However today she is 3.4.  We will give 40 mill [place] riders and recheck zach

rrow.


Plan:


Change IV fluids to D5 because of hypernatremia.


Potassium riders, 40 mill equivalents today and recheck tomorrow


Avoid nephrotoxins


Follow BMP daily.








(3) Obtundation improved. 





more awake and able to eat some food. responds to voice and commands but slowly


Conclusion/Plan: 


Patient is underlying dementia and Parkinson's.  She presented as obtunded 

condition 2 months ago also when she had sepsis


Plan:


continue abx until at baseline. 





(4) COVID


Conclusion/Plan: 


According to the ED providers discussion with the daughter by phone, this 

patient tested positive for COVID 1 week ago.


The patient has some hypoxia, patchy infiltrates on chest x-ray


Plan:


IV Decadron Day #4, plan for 10 days


The patient is not a candidate to start remdesivir given her onset being a week 

ago or more





(5) Hypoxia resolved


Conclusion/Plan: 


This is likely from her pneumonia. Even though she was saturating at 97 to 99%, 

she was still getting nasal cannula on her first day here.  Starting on the 27th

we took off her oxygen and she has been 99 to 100% on room air.


Plan:


We will provide supplemental oxygen, saturation target will be 90% or above





(6) UTI (urinary tract infection)


Conclusion/Plan: 


Patient gets frequent UTIs.  The UA now showed a lot of squamous cells therefore

no culture was indicated.


Patient has already been started on empiric ceftriaxone which would treat a UTI 

as well and is treating this pneumonia.  Proteus is growing in her blood.


Plan:


No repeat UA


Continue empiric iv ceftriaxone


Qualifiers: 


   Urinary tract infection type: site unspecified   Hematuria presence: without 

hematuria   Qualified Code(s): N39.0 - Urinary tract infection, site not 

specified   





(7) Parkinson's disease dementia


Conclusion/Plan: 


The daughter had told this that even at her best, the patient has hallucination

s. I will resume her meds since she  is swallowing now.





mirtazapine 7.5 p.o. every afternoon.  She is on 2 forms of carbidopa levodopa. 

Extended release 25/100 twice daily.  And carbidopa levodopa 25/100 at 6 in the 

morning, 10 AM, 1400, 1800, and 2200.








(8)Severe protein calorie malnutrition





In April 2017 she was 74 kg.  June 2017 she was 69 kg.  November 2021 she was 54

kg.  January 2023 she was 45 kg.  She is 44 kg on admission.


My suspicion is that she has dysphagia due to her Parkinson's.  Lack of 

coordination with swallowing mechanism.  This is very common with motor neuron 

disease.  Focus is on comfort measures so she is not a candidate for tube feeds 

or PEG tube.








Qualifiers: 


   Qualified Code(s): A41.9 - Sepsis, unspecified organism; R65.21 - Severe 

sepsis with septic shock; N17.9 - Acute kidney failure, unspecified Relaxed

## 2023-05-08 NOTE — OB PROVIDER H&P - NS_PREOPBLOODCONS_OBGYN_ALL_OB
n/a Thalidomide Counseling: I discussed with the patient the risks of thalidomide including but not limited to birth defects, anxiety, weakness, chest pain, dizziness, cough and severe allergy.

## 2023-05-17 ENCOUNTER — EMERGENCY (EMERGENCY)
Facility: HOSPITAL | Age: 38
LOS: 1 days | Discharge: HOME | End: 2023-05-17
Attending: STUDENT IN AN ORGANIZED HEALTH CARE EDUCATION/TRAINING PROGRAM | Admitting: STUDENT IN AN ORGANIZED HEALTH CARE EDUCATION/TRAINING PROGRAM
Payer: MEDICAID

## 2023-05-17 VITALS
TEMPERATURE: 98 F | SYSTOLIC BLOOD PRESSURE: 153 MMHG | HEART RATE: 17 BPM | OXYGEN SATURATION: 99 % | RESPIRATION RATE: 17 BRPM | DIASTOLIC BLOOD PRESSURE: 95 MMHG

## 2023-05-17 DIAGNOSIS — Z98.890 OTHER SPECIFIED POSTPROCEDURAL STATES: Chronic | ICD-10-CM

## 2023-05-17 PROCEDURE — 99284 EMERGENCY DEPT VISIT MOD MDM: CPT

## 2023-05-17 PROCEDURE — 71046 X-RAY EXAM CHEST 2 VIEWS: CPT | Mod: 26

## 2023-05-17 NOTE — ED PROVIDER NOTE - PATIENT PORTAL LINK FT
You can access the FollowMyHealth Patient Portal offered by [facility] by registering at the following website: [email or web address]. By joining [facility]’s FollowMyHealth portal, you will also be able to view your health information using other applications (apps) compatible with our system.

## 2023-05-17 NOTE — ED PROVIDER NOTE - ATTENDING CONTRIBUTION TO CARE
Dr. Muñoz, Attending Physician-  I performed a face to face bedside interview with patient regarding history of present illness, review of symptoms and past medical history. I completed an independent physical exam.  I have discussed patient's plan of care with the fellow.    38F, asthma, CP who presents with cough. Reports persistent, non-productive cough for the past 3 weeks. No recent travel. No sick contacts. No recent ABX/hospitalizations. No hx of immunocompromise. Had sick contact (child) at home. On ROS, denies headaches, fevers, chills, sputum, cp, sob, abdominal pain, nvd, dysuria, hematuria, recent travel, trauma, syncope, black/bloody stools. Physical: afebrile, well appearing, rrr, ctabl, abdomen soft. Plan: CXR.

## 2023-05-17 NOTE — ED PROVIDER NOTE - OBJECTIVE STATEMENT
38-year-old female history of asthma, cerebral palsy, presents with cough for 3 weeks.  Patient has been trying honey warm liquids at home without improvement.  No increased work of breathing no fevers chills no shortness of breath.  Patient states her son had a cough before she started coughing weeks ago.

## 2023-05-17 NOTE — ED PROVIDER NOTE - PROGRESS NOTE DETAILS
Jigna Mckeon M.D. Tox Fellow  CXR wet read-no acute findings. Pt aware and agrees to followup with PMD.

## 2023-05-17 NOTE — ED PROVIDER NOTE - CLINICAL SUMMARY MEDICAL DECISION MAKING FREE TEXT BOX
38-year-old female history of asthma, cerebral palsy, presents with cough for 3 weeks.  On exam: No increased work of breathing, no signs of accessory muscle use, clear lungs bilaterally no wheeze no rhonchi no rails.  SPO2 99% on room air.  Differential includes, but not limited to: Bronchitis secondary to asthma and UTI, lower suspicion for asthma exacerbation given lack of wheezing on exam; lower suspicion for bacterial pneumonia but will obtain chest x-ray to rule out.  Anticipate outpatient follow-up.

## 2023-05-17 NOTE — ED PROVIDER NOTE - PHYSICAL EXAMINATION
GEN: Patient awake alert NAD.   HEENT: normocephalic, atraumatic, EOMI, no scleral icterus, moist MM  CARDIAC: RRR, S1, S2, no murmur.   PULM: CTA B/L no wheeze, rhonchi, rales.   MSK: Moving all extremities.     NEURO: A&Ox3, gait normal  SKIN: warm, dry, no rash.

## 2023-05-17 NOTE — ED PROVIDER NOTE - NSFOLLOWUPINSTRUCTIONS_ED_ALL_ED_FT
** You have bronchitis.    ** Follow up with your primary care doctor in the next 72 hours.   ** Go to the nearest Emergency Department if you experience any new or concerning symptoms, such as:   - chest pain  - difficulty breathing  - passing out  - unable to eat or drink  - fever, chills

## 2023-09-27 ENCOUNTER — APPOINTMENT (OUTPATIENT)
Age: 38
End: 2023-09-27

## 2023-10-27 ENCOUNTER — APPOINTMENT (OUTPATIENT)
Age: 38
End: 2023-10-27

## 2023-12-15 ENCOUNTER — APPOINTMENT (OUTPATIENT)
Age: 38
End: 2023-12-15
Payer: COMMERCIAL

## 2023-12-15 PROCEDURE — D2392: CPT

## 2024-01-24 ENCOUNTER — APPOINTMENT (OUTPATIENT)
Age: 39
End: 2024-01-24

## 2024-02-09 NOTE — ED ADULT NURSE NOTE - NSFALLRSKINDICATORS_ED_ALL_ED
[Follow Up] : a follow up visit [Parents] : parents [FreeTextEntry1] : Bilateral Clubfeet, Arthrogryposis no

## 2024-03-04 ENCOUNTER — APPOINTMENT (OUTPATIENT)
Age: 39
End: 2024-03-04

## 2024-07-09 NOTE — OB RN DELIVERY SUMMARY - NS_MATERNALMORBID_OBGYN_ALL_OB
Spoke with PT about follow-up care. Confirmed scheduled appts. Offered additional appts/resources and patient declined. No further follow-up needed. EmPATH number provided if they would like additional assistance.   
None

## 2024-09-20 ENCOUNTER — APPOINTMENT (OUTPATIENT)
Age: 39
End: 2024-09-20

## 2024-09-30 ENCOUNTER — APPOINTMENT (OUTPATIENT)
Age: 39
End: 2024-09-30

## 2025-03-26 ENCOUNTER — APPOINTMENT (OUTPATIENT)
Age: 40
End: 2025-03-26

## 2025-07-10 ENCOUNTER — APPOINTMENT (OUTPATIENT)
Age: 40
End: 2025-07-10
Payer: MEDICAID

## 2025-07-10 PROCEDURE — D0210: CPT

## 2025-07-10 PROCEDURE — D4355: CPT

## 2025-08-27 ENCOUNTER — APPOINTMENT (OUTPATIENT)
Age: 40
End: 2025-08-27